# Patient Record
Sex: MALE | Race: WHITE | Employment: OTHER
[De-identification: names, ages, dates, MRNs, and addresses within clinical notes are randomized per-mention and may not be internally consistent; named-entity substitution may affect disease eponyms.]

---

## 2017-12-13 PROBLEM — I35.0 SEVERE AORTIC STENOSIS: Status: ACTIVE | Noted: 2017-12-13

## 2017-12-13 PROBLEM — Z95.2 S/P TAVR (TRANSCATHETER AORTIC VALVE REPLACEMENT): Status: ACTIVE | Noted: 2017-12-13

## 2018-03-12 ENCOUNTER — TELEPHONE (OUTPATIENT)
Dept: CASE MANAGEMENT | Age: 83
End: 2018-03-12

## 2018-03-12 NOTE — LETTER
CT Lung Screening/ Lung Nodule Program  [address] [place] [address]. [place], [address]       TO:   Seth Hollingsworth DO    FAX:   [phone]        FROM:   [place], [place] Lung Screening Program    DATE:   3/12/2018    PHONE:  [phone]    FAX:    [phone]      Comments:  Incidental Pulmonary Nodule Notification    RE:  Gissell Dash  7/20/1930        The information contained in this fax message is intended only for Personal and Confidential use of the designated recipient(s) names above. This information is to be handled as Privileged and Confidential. If the reader of this message is not the intended recipient, you are hereby notified that you have received this document in error, and that any review, dissemination, distribution, or copying of this message is strictly prohibited. If you receive this communication in error, please notify us immediately at the above number and return the original message to us by mail. Thank you. [facility] of [facility]. Medical Imaging Services                              3/12/2018           Seth Hollingsworth DO  [address] [place] [address]. [place] [id number]  Fax: [phone]      Dear  Seth Hollingsworth DO :                                      This is a notification from the [facility] Nodule Program.  Your patient, Gissell Dash (7/20/1930) had an imaging study (11/30/17) that noted an incidental pulmonary nodule/s. Follow up was recommended according to [facility] for Management of Pulmonary Nodules If this patient is not currently active within your practice, please contact us immediately so we can update records. This letter is a notification only, if you would like this patient enrolled and followed by our program and it's providers, please call our patient navigator at [phone].      If you have any additional questions or concerns regarding our Lung Nodule Program or our CT Lung Cancer Screening Program,  please don't hesitate to call. Sincerely,    Pulmonary Nodule Program  [address]:  [phone]  F:  [phone]                        Medical Imaging Services      3/12/2018      [address] [address]              Dear Adi Montano records indicate that over the past year you had an imaging study/studies done while a patient at a [facility] facility. Because your health is our primary concern, we want to make sure we have the correct primary care physician on file. We currently have DO em Valentin as your primary care physician. If this information is not accurate, please call [phone] and provide us with the correct information. If this information is correct, please make sure you have discussed your visit with your physician and understand all results and any follow up recommendations that may or may not be needed now or in the future.            Sincerely,    [facility]:  [phone]  F:  [phone]

## 2018-03-12 NOTE — TELEPHONE ENCOUNTER
Incoming notification of incidental pulmonary nodule. No call was made, encounter was opened for documentation in the lung nodule navigator flow sheet. Patient has suggested follow up per [facility] for management of incidental pulmonary nodules. Patients with nodules measuring under 0.8cm are not automatically enrolled into incidental pulmonary nodule program.   Notification of nodules letter faxed to Pushpa Newsome DO 3/12/2018 4:11 PM along with instructions on how to enroll this patient into our incidental pulmonary nodule program if desired. Receipt verified. Letter mailed to patients home stating he should contact his primary care physician to discuss any follow up recommendations.        Malissa Ceballos, Lung Nodule Navigator

## 2018-05-11 ENCOUNTER — OFFICE VISIT (OUTPATIENT)
Dept: CARDIOLOGY CLINIC | Age: 83
End: 2018-05-11
Payer: MEDICARE

## 2018-05-11 VITALS
WEIGHT: 204.2 LBS | HEIGHT: 70 IN | SYSTOLIC BLOOD PRESSURE: 128 MMHG | RESPIRATION RATE: 18 BRPM | BODY MASS INDEX: 29.23 KG/M2 | HEART RATE: 63 BPM | DIASTOLIC BLOOD PRESSURE: 72 MMHG

## 2018-05-11 DIAGNOSIS — I10 HYPERTENSION, UNSPECIFIED TYPE: Chronic | ICD-10-CM

## 2018-05-11 DIAGNOSIS — I25.10 CORONARY ARTERY DISEASE INVOLVING NATIVE CORONARY ARTERY OF NATIVE HEART WITHOUT ANGINA PECTORIS: Primary | Chronic | ICD-10-CM

## 2018-05-11 PROCEDURE — 99213 OFFICE O/P EST LOW 20 MIN: CPT | Performed by: INTERNAL MEDICINE

## 2018-05-11 PROCEDURE — 93000 ELECTROCARDIOGRAM COMPLETE: CPT | Performed by: INTERNAL MEDICINE

## 2018-12-13 ENCOUNTER — HOSPITAL ENCOUNTER (OUTPATIENT)
Dept: NON INVASIVE DIAGNOSTICS | Age: 83
Discharge: HOME | End: 2018-12-13
Payer: MEDICARE

## 2018-12-13 VITALS
HEART RATE: 71 BPM | DIASTOLIC BLOOD PRESSURE: 72 MMHG | HEIGHT: 70 IN | TEMPERATURE: 98 F | RESPIRATION RATE: 24 BRPM | SYSTOLIC BLOOD PRESSURE: 159 MMHG | WEIGHT: 198 LBS | BODY MASS INDEX: 28.35 KG/M2

## 2018-12-13 LAB
LV EF: 60 %
LVEF MODALITY: NORMAL

## 2018-12-13 PROCEDURE — 99213 OFFICE O/P EST LOW 20 MIN: CPT | Performed by: PHYSICIAN ASSISTANT

## 2018-12-13 PROCEDURE — 93306 TTE W/DOPPLER COMPLETE: CPT

## 2018-12-13 NOTE — PROGRESS NOTES
[facility]  Visit Note      Patient name: Anny Cui    Reason for visit: TAVR follow up     Referring Physician:  Paola Pitts MD    Primary Care Physician: Ifeanyi Camarena DO    Date of service: 12/13/2018      Chief Complaint: TAVR follow up     HPI: Mr. Abida Tyler presents for follow up s/p TAVR on 12/13/17. He is doing well. He denies chest pain, sob/kaminski, orthopnea, PND, edema, palpitations or syncope. He says \"if my legs worked I'd be doing great. \" He has foot drop on the right since hip surgery 15 years ago. He otherwise denies complaints. Allergies: No Known Allergies    Home medications:    Current Outpatient Prescriptions   Medication Sig Dispense Refill    CycloSPORINE (RESTASIS OP) Apply 1 drop to eye 2 times daily      Cholecalciferol (VITAMIN D3) 3000 units TABS Take by mouth      Budesonide-Formoterol Fumarate (SYMBICORT IN) Inhale into the lungs      nitroGLYCERIN (NITROSTAT) 0.4 MG SL tablet Place 1 tablet under the tongue every 5 minutes as needed for Chest pain 25 tablet 5    isosorbide mononitrate (IMDUR) 60 MG CR tablet Take 1 tablet by mouth daily 90 tablet 3    metoprolol (LOPRESSOR) 25 MG tablet Take 1 tablet by mouth 2 times daily 180 tablet 3    tamsulosin (FLOMAX) 0.4 MG capsule Take 0.4 mg by mouth every evening   0    ipratropium-albuterol (DUONEB) 0.5-2.5 (3) MG/3ML SOLN nebulizer solution       atorvastatin (LIPITOR) 40 MG tablet Take 40 mg by mouth daily       aspirin 81 MG EC tablet Take 81 mg by mouth daily.  ALBUTEROL IN Inhale 2.5 mg into the lungs as needed.  levothyroxine (SYNTHROID) 125 MCG tablet Take 125 mcg by mouth daily.          Current Facility-Administered Medications   Medication Dose Route Frequency Provider Last Rate Last Dose    perflutren lipid microspheres (DEFINITY) injection 1.65 mg  1.5 mL Intravenous ONCE PRN Doneta Bernheim, PA           Past Medical History:  Past Medical History:   Diagnosis Date

## 2019-05-22 NOTE — PROGRESS NOTES
perfusion abnormality. 14. Chronic kidney disease. BUN 20, Cr 1.6, GFR 44 - 12/12/2012. 15. Lexiscan MPS, 03/26/2013. Inferolateral infarction. Small area distal anteroseptal and apical ischemia. EF preserved at 72%. TID is present suggesting multivessel disease. Intermediate to high risk study. 16. Echo, 04/25/2013. Normal LV size, thickness and systolic function, Stage II diastolic dysfunction, LAE, moderate AS, peak/mean gradients 25/12 mmHg, SUSAN 1.2 cm², RVSP normal.  17. Left parotid mass removed, Dr. Iqra Bangura at [facility], 07/10/2014. 18. Echo, 03/26/2015. Normal LV size, wall thickness and EF. Stage Ia diastolic dysfunction. Marked LAE. Moderate aortic stenosis, mean gradient 18 mmHg, SUSAN ~1.3 cm². VTI 0.39. Moderate MAC, mild MR.   19. TTE 10/09/17. AV moderately sclerotic. AV leaflet motion restricted. AV peak gradient = 3.8 m/s. SUSAN 0.7 cm2. DI = 0.25. Severe aortic stenosis. 20. Cardiac catheterization, 11/16/17. [facility] no significant stenosis. LAD mid 100% chronic total occlusion. CX mid 80% stenosis, distal chronic total occlusion. RCA dominant vessel. Posterolateral branch mid 100% occlusion. PDA ostial 80% stenosis. 1.7-2.0 mm vessel. SVG-OM proximal diffuse 80% stenosis. Good distal runoff. OM supplies a very tiny vessel. SVG Y-graft to PDA and diagonal widely patent. LIMA-LAD widely patent. 99% stenosis in apical portion of LAD. approximately 1 mm vessel at this level. 21. S/P TAVR on 12/13/17. .  22. Echo, 1/11/2018. Normal LV systolic function. EF 60-65%. Mild concentric LVH. Normal RV size and function. Severely dilated LA by volume index. Severe MAC. Mild MR. History of TAVR (Brandi 3) with 26 mm bioprosthetic valve. AV mean gradient 13 mmHg. Mild TR.  RVSP 32 mmHg.     Review of Systems:  Constitutional: negative for fever and chills  Respiratory: negative for cough and hemoptysis  Cardiovascular:   Gastrointestinal: negative for abdominal pain, diarrhea, nausea and vomiting  Genitourinary:negative for dysuria and hematuria  Derm: negative for rash and skin lesion(s)  Neurological: negative for seizures and tremors  Endocrine: negative for diabetic symptoms including polydipsia and polyuria  Musculoskeletal: negative for CTD  Psychiatric: negative for psychosis and major depression    On examination, he is alert, pleasant, elderly man in no distress. Skin is warm and dry. Respirations are unlabored. /70   Pulse 69   Resp 14   Ht 5' 10\" (1.778 m)   Wt 198 lb (89.8 kg)   BMI 28.41 kg/m² . HEENT negative for scleral icterus. Extraocular muscles intact. No facial asymmetry or central cyanosis. Neck without masses or goiter. No bruit or JVD. Cardiac apex not displaced. Rhythm regular. Abdomen normal.  Extremities without edema. Lung fields are clear. EKG today shows sinus mechanism 69/m. 1st degree block. Possible old inferior MI. The patient symptomatically and functionally stable. Medications are reviewed today and no changes made. He will have follow-up in one year.     At completion of today's visit, medications include the following:    Current Outpatient Medications:     CycloSPORINE (RESTASIS OP), Apply 1 drop to eye 2 times daily, Disp: , Rfl:     Cholecalciferol (VITAMIN D3) 3000 units TABS, Take by mouth, Disp: , Rfl:     Budesonide-Formoterol Fumarate (SYMBICORT IN), Inhale into the lungs, Disp: , Rfl:     nitroGLYCERIN (NITROSTAT) 0.4 MG SL tablet, Place 1 tablet under the tongue every 5 minutes as needed for Chest pain, Disp: 25 tablet, Rfl: 5    isosorbide mononitrate (IMDUR) 60 MG CR tablet, Take 1 tablet by mouth daily, Disp: 90 tablet, Rfl: 3    metoprolol (LOPRESSOR) 25 MG tablet, Take 1 tablet by mouth 2 times daily, Disp: 180 tablet, Rfl: 3    tamsulosin (FLOMAX) 0.4 MG capsule, Take 0.4 mg by mouth every evening , Disp: , Rfl: 0    ipratropium-albuterol (DUONEB) 0.5-2.5 (3) MG/3ML

## 2019-05-23 ENCOUNTER — OFFICE VISIT (OUTPATIENT)
Dept: CARDIOLOGY CLINIC | Age: 84
End: 2019-05-23
Payer: MEDICARE

## 2019-05-23 VITALS
HEART RATE: 69 BPM | DIASTOLIC BLOOD PRESSURE: 70 MMHG | SYSTOLIC BLOOD PRESSURE: 110 MMHG | HEIGHT: 70 IN | WEIGHT: 198 LBS | RESPIRATION RATE: 14 BRPM | BODY MASS INDEX: 28.35 KG/M2

## 2019-05-23 DIAGNOSIS — I25.10 CORONARY ARTERY DISEASE INVOLVING NATIVE CORONARY ARTERY OF NATIVE HEART WITHOUT ANGINA PECTORIS: Primary | ICD-10-CM

## 2019-05-23 PROCEDURE — 93000 ELECTROCARDIOGRAM COMPLETE: CPT | Performed by: INTERNAL MEDICINE

## 2019-05-23 PROCEDURE — 99213 OFFICE O/P EST LOW 20 MIN: CPT | Performed by: INTERNAL MEDICINE

## 2019-09-30 ENCOUNTER — OFFICE VISIT (OUTPATIENT)
Dept: CARDIOLOGY CLINIC | Age: 84
End: 2019-09-30
Payer: MEDICARE

## 2019-09-30 VITALS
DIASTOLIC BLOOD PRESSURE: 70 MMHG | HEIGHT: 70 IN | SYSTOLIC BLOOD PRESSURE: 130 MMHG | RESPIRATION RATE: 16 BRPM | BODY MASS INDEX: 28.37 KG/M2 | WEIGHT: 198.2 LBS | HEART RATE: 71 BPM

## 2019-09-30 DIAGNOSIS — I25.10 CORONARY ARTERY DISEASE INVOLVING NATIVE CORONARY ARTERY OF NATIVE HEART WITHOUT ANGINA PECTORIS: Primary | ICD-10-CM

## 2019-09-30 PROCEDURE — 93000 ELECTROCARDIOGRAM COMPLETE: CPT | Performed by: INTERNAL MEDICINE

## 2019-09-30 PROCEDURE — 99213 OFFICE O/P EST LOW 20 MIN: CPT | Performed by: INTERNAL MEDICINE

## 2019-12-12 ENCOUNTER — HOSPITAL ENCOUNTER (OUTPATIENT)
Dept: NON INVASIVE DIAGNOSTICS | Age: 84
Discharge: HOME | End: 2019-12-12
Payer: MEDICARE

## 2019-12-12 VITALS
BODY MASS INDEX: 27.92 KG/M2 | WEIGHT: 195 LBS | DIASTOLIC BLOOD PRESSURE: 77 MMHG | TEMPERATURE: 98.3 F | SYSTOLIC BLOOD PRESSURE: 164 MMHG | HEIGHT: 70 IN | HEART RATE: 65 BPM

## 2019-12-12 LAB
LV EF: 53 %
LVEF MODALITY: NORMAL

## 2019-12-12 PROCEDURE — 99213 OFFICE O/P EST LOW 20 MIN: CPT | Performed by: PHYSICIAN ASSISTANT

## 2019-12-12 PROCEDURE — 93306 TTE W/DOPPLER COMPLETE: CPT

## 2019-12-12 PROCEDURE — 99211 OFF/OP EST MAY X REQ PHY/QHP: CPT

## 2020-07-07 ENCOUNTER — HOSPITAL ENCOUNTER (OUTPATIENT)
Age: 85
Discharge: HOME | End: 2020-07-07
Payer: MEDICARE

## 2020-07-07 ENCOUNTER — OFFICE VISIT (OUTPATIENT)
Dept: CARDIOLOGY CLINIC | Age: 85
End: 2020-07-07
Payer: MEDICARE

## 2020-07-07 VITALS
DIASTOLIC BLOOD PRESSURE: 62 MMHG | SYSTOLIC BLOOD PRESSURE: 128 MMHG | HEART RATE: 77 BPM | TEMPERATURE: 97.3 F | HEIGHT: 69 IN | WEIGHT: 191.4 LBS | RESPIRATION RATE: 16 BRPM | BODY MASS INDEX: 28.35 KG/M2

## 2020-07-07 LAB — PRO-BNP: ABNORMAL PG/ML (ref 0–450)

## 2020-07-07 PROCEDURE — 99214 OFFICE O/P EST MOD 30 MIN: CPT | Performed by: INTERNAL MEDICINE

## 2020-07-07 PROCEDURE — 36415 COLL VENOUS BLD VENIPUNCTURE: CPT

## 2020-07-07 PROCEDURE — 93000 ELECTROCARDIOGRAM COMPLETE: CPT | Performed by: INTERNAL MEDICINE

## 2020-07-07 PROCEDURE — 83880 ASSAY OF NATRIURETIC PEPTIDE: CPT

## 2020-07-07 RX ORDER — BUSPIRONE HYDROCHLORIDE 5 MG/1
TABLET ORAL
COMMUNITY
Start: 2020-06-26

## 2020-07-07 NOTE — PROGRESS NOTES
[facility]  Judy Gomes. Lucina Brower M.D. Kieran Mendes M.D.  Brennan Blake. Alicia Kearns M.D. Lor Wilkins M.D. Jocelyn Jimenez M.D. MD Vanessa Cast   7/20/1930  Bell Castillo DO      This 42-year-old man had outpatient cardiac assessment today. He is accompanied by family. He has becoming progressively dyspneic. This occurs with lying flat or minor activity. According to the family this may be manifest as \"anxiety\". He has had bilateral ankle swelling which is reported to not be dependent. He has not had sweats dizziness or chest pain. He has a history of chronic ischemic heart disease (CABG 2004) and TAVR (12/13/2017). Patient is increasingly sedentary with advanced age and his right foot drop. He is not having any chest pain or dizziness, however. He denies orthopnea or PND. He uses a walker. He is concerned that some of his leg weakness may be due to Lipitor and today he inquires about switching to Crestor. Medical History:  Medical History:  1. Presentation with atypical chest pain, 02/2004. Abnormal MPS. OP cardiac catheterization showed severe LAD and CX stenoses. The RCA had no significant narrowing. EF normal.  2. CABG, 03/2004 by Dr. Annmarie [facility]-LAD, SVG-D1, SVG-OM1 and SVG-posterolateral branch of the CX. 3. TIA - transient visual loss, 1998. CEA [id number].   4. Back surgery and right hip replacement. Right foot drop developed after the hip surgery. Redo hip surgery in about 2004. 5. DVT circa 50 years ago. 6. COPD/reactive airway disease. Cigarette abuse, 7.5 pack years. Abstinent since 01/01/2004. 7. Essential hypertension. 8. Hyperlipidemia. 9. Lexiscan MPS, 04/09/2007. Small area of mostly fixed perfusion defect in inferolateral wall at basal and mid left ventricular levels suggesting previous MI with minimal ischemia within the infarction zone.   Mild inferolateral approximately 1 mm vessel at this level. 21. S/P TAVR on 12/13/17. .  22. Echo, 1/11/2018. .  EF 60-65%. Mild concentric LVH. Normal RV   Severely dilated LA Severe MAC. Mild MR. History of TAVR (Brandi 3) with 26 mm bioprosthetic valve. AV mean gradient 13 mmHg. Mild TR.  RVSP 32 mmHg.         Review of Systems:  Constitutional: negative for fever and chills  Respiratory: negative for cough and hemoptysis  Cardiovascular:   Gastrointestinal: negative for abdominal pain, diarrhea, nausea and vomiting  Genitourinary:negative for dysuria and hematuria  Derm: negative for rash and skin lesion(s)  Neurological: negative for seizures and tremors  Endocrine: negative for diabetic symptoms including polydipsia and polyuria  Musculoskeletal: negative for CTD  Psychiatric: negative for psychosis and major depression    On examination, he is a pleasant alert appropriate elderly  man in no distress skin is warm and dry. Respirations are unlabored. /62 (Site: Right Upper Arm, Position: Sitting, Cuff Size: Medium Adult)   Pulse 77   Temp 97.3 °F (36.3 °C)   Resp 16   Ht 5' 9\" (1.753 m)   Wt 191 lb 6.4 oz (86.8 kg)   BMI 28.26 kg/m² . HEENT negative for scleral icterus. Extraocular muscles intact. No facial asymmetry or central cyanosis. Neck without masses or goiter. No bruit or JVD. Cardiac apex not displaced. Rhythm regular. Abdomen normal.  Extremities without edema. EKG today shows normal sinus rhythm. Left IVCD. Possible old lateral MI. Neck veins are not distended today detracting from the idea that his dyspnea is related to decompensated CHF. However, he does have typical pitting ankle edema. An echo is ordered. A proBNP is obtained today. Labs done recently Dr. Cristina Purcell office will be obtained and further recommendations made. There was a prolonged face-to-face discussion with the patient and family regarding the issues in the decision-making process and testing.     At

## 2020-07-08 ENCOUNTER — TELEPHONE (OUTPATIENT)
Dept: CARDIOLOGY CLINIC | Age: 85
End: 2020-07-08

## 2020-07-08 RX ORDER — FUROSEMIDE 40 MG/1
40 TABLET ORAL DAILY
Qty: 30 TABLET | Refills: 5 | Status: SHIPPED
Start: 2020-07-08 | End: 2020-09-08 | Stop reason: SDUPTHER

## 2020-07-14 ENCOUNTER — OFFICE VISIT (OUTPATIENT)
Dept: CARDIOLOGY CLINIC | Age: 85
End: 2020-07-14
Payer: MEDICARE

## 2020-07-14 ENCOUNTER — HOSPITAL ENCOUNTER (OUTPATIENT)
Age: 85
Discharge: HOME | End: 2020-07-16
Payer: MEDICARE

## 2020-07-14 VITALS
DIASTOLIC BLOOD PRESSURE: 68 MMHG | SYSTOLIC BLOOD PRESSURE: 110 MMHG | WEIGHT: 178 LBS | HEART RATE: 73 BPM | BODY MASS INDEX: 25.48 KG/M2 | RESPIRATION RATE: 18 BRPM | HEIGHT: 70 IN

## 2020-07-14 LAB
ANION GAP SERPL CALCULATED.3IONS-SCNC: 20 MMOL/L (ref 7–16)
BUN BLDV-MCNC: 37 MG/DL (ref 8–23)
CALCIUM SERPL-MCNC: 9.7 MG/DL (ref 8.6–10.2)
CHLORIDE BLD-SCNC: 99 MMOL/L (ref 98–107)
CO2: 22 MMOL/L (ref 22–29)
CREAT SERPL-MCNC: 1.6 MG/DL (ref 0.7–1.2)
GFR AFRICAN AMERICAN: 49
GFR NON-AFRICAN AMERICAN: 41 ML/MIN/1.73
GLUCOSE BLD-MCNC: 111 MG/DL (ref 74–99)
POTASSIUM SERPL-SCNC: 4 MMOL/L (ref 3.5–5)
PRO-BNP: ABNORMAL PG/ML (ref 0–450)
SODIUM BLD-SCNC: 141 MMOL/L (ref 132–146)

## 2020-07-14 PROCEDURE — 80048 BASIC METABOLIC PNL TOTAL CA: CPT

## 2020-07-14 PROCEDURE — 83880 ASSAY OF NATRIURETIC PEPTIDE: CPT

## 2020-07-14 PROCEDURE — 99213 OFFICE O/P EST LOW 20 MIN: CPT | Performed by: INTERNAL MEDICINE

## 2020-07-14 NOTE — PROGRESS NOTES
[facility]  Solis Gentile. Sonny Goltz, M.D. Simon eVga M.D.  Gurvinder Huang. Carrol Miller M.D. Layne Ramires M.D. Prakash Villafana M.D. MD Marcella Savage   7/20/1930  Asad Murray DO      This 66-year-old man returns to the office today for follow-up. He was diagnosed with decompensated CHF last week and started on Lasix. According to the patient he has diuresed 14 pounds and had resolution of ankle edema although he continues to have some increased abdominal girth. He is less short of breath and is able to lie flatter. Medical History:  Medical History:  1. Presentation with atypical chest pain, 02/2004.  Abnormal MPS.  OP cardiac catheterization showed severe LAD and CX stenoses.  The RCA had no significant narrowing.  EF normal.  2. CABG, 03/2004 by Dr. Bety Luong.  LIMA-LAD, SVG-D1, SVG-OM1 and SVG-posterolateral branch of the CX. 3. TIA - transient visual loss, 1998.  CEA [id number].   4. Back surgery and right hip replacement.  Right foot drop developed after the hip surgery.  Redo hip surgery in about 2004.    5. DVT circa 50 years ago. 6. COPD/reactive airway disease.  Cigarette abuse, 7.5 pack years.  Abstinent since 01/01/2004. 7. Essential hypertension. 8. Hyperlipidemia.    9. Lexiscan MPS, 04/09/2007.  Small area of mostly fixed perfusion defect in inferolateral wall at basal and mid left ventricular levels suggesting previous MI with minimal ischemia within the infarction zone.  Mild inferolateral hypokinesis.  EF normal at 72%. 10. Echo, 04/10/2007.  Normal LV size, wall motion and function with EF >60% and Stage I diastolic dysfunction.  Moderate PEDRO.  Aortic sclerosis with mild stenosis and SUSAN 2.1 cm2.   Mild MR, TR and PHTN.    11. Family history positive for multiple members developing heart disease beginning in their 46s and 62s.    12. No drug allergies.    13. Lexiscan MPS, 07/06/2010.  Wall motion normal,

## 2020-07-16 ENCOUNTER — TELEPHONE (OUTPATIENT)
Dept: CARDIOLOGY CLINIC | Age: 85
End: 2020-07-16

## 2020-07-28 ENCOUNTER — OFFICE VISIT (OUTPATIENT)
Dept: CARDIOLOGY CLINIC | Age: 85
End: 2020-07-28
Payer: MEDICARE

## 2020-07-28 ENCOUNTER — HOSPITAL ENCOUNTER (OUTPATIENT)
Age: 85
Discharge: HOME | End: 2020-07-30
Payer: MEDICARE

## 2020-07-28 VITALS
SYSTOLIC BLOOD PRESSURE: 124 MMHG | WEIGHT: 181 LBS | HEART RATE: 71 BPM | HEIGHT: 69 IN | RESPIRATION RATE: 16 BRPM | BODY MASS INDEX: 26.81 KG/M2 | DIASTOLIC BLOOD PRESSURE: 60 MMHG

## 2020-07-28 LAB
ANION GAP SERPL CALCULATED.3IONS-SCNC: 17 MMOL/L (ref 7–16)
BUN BLDV-MCNC: 23 MG/DL (ref 8–23)
CALCIUM SERPL-MCNC: 9 MG/DL (ref 8.6–10.2)
CHLORIDE BLD-SCNC: 100 MMOL/L (ref 98–107)
CO2: 25 MMOL/L (ref 22–29)
CREAT SERPL-MCNC: 1.4 MG/DL (ref 0.7–1.2)
GFR AFRICAN AMERICAN: 58
GFR NON-AFRICAN AMERICAN: 48 ML/MIN/1.73
GLUCOSE BLD-MCNC: 147 MG/DL (ref 74–99)
POTASSIUM SERPL-SCNC: 3.1 MMOL/L (ref 3.5–5)
PRO-BNP: ABNORMAL PG/ML (ref 0–450)
SODIUM BLD-SCNC: 142 MMOL/L (ref 132–146)

## 2020-07-28 PROCEDURE — 93000 ELECTROCARDIOGRAM COMPLETE: CPT | Performed by: INTERNAL MEDICINE

## 2020-07-28 PROCEDURE — 99213 OFFICE O/P EST LOW 20 MIN: CPT | Performed by: INTERNAL MEDICINE

## 2020-07-28 PROCEDURE — 83880 ASSAY OF NATRIURETIC PEPTIDE: CPT

## 2020-07-28 PROCEDURE — 80048 BASIC METABOLIC PNL TOTAL CA: CPT

## 2020-07-28 NOTE — PROGRESS NOTES
[facility]  [facility]. Nelly Gibson M.D. Manuel Harvey M.D.  Deyvi Rubio. Deisy López M.D. Rosalba Marcial M.D. Melanie Wright, Patrick Beverly M.D. MD Verónica Kulkarni   7/20/1930  Africa Souza DO      This 19-year-old man is seen for outpatient cardiac follow-up today. He has a history of CABG and TAVR. He has developed CHF with clinical volume overload. He was started on diuretics recently has had had clinical improvement. Today he has had resolution of ankle edema. He still has trouble lying flat. He has no appetite. He denies chest pain. He complains about right inguinal pain and burning. Medical History:  1. Presentation with atypical chest pain, 02/2004.  Abnormal MPS.  OP cardiac catheterization showed severe LAD and CX stenoses.  The RCA had no significant narrowing.  EF normal.  2. CABG, 03/2004 by Dr. Leanna Ellsworth.  LIMA-LAD, SVG-D1, SVG-OM1 and SVG-posterolateral branch of the CX. 3. TIA - transient visual loss, 1998.  CEA [id number].   4. Back surgery and right hip replacement.  Right foot drop developed after the hip surgery.  Redo hip surgery in about 2004.    5. DVT circa 50 years ago. 6. COPD/reactive airway disease.  Cigarette abuse, 7.5 pack years.  Abstinent since 01/01/2004. 7. Essential hypertension. 8. Hyperlipidemia.    9. Lexiscan MPS, 04/09/2007.  Small area of mostly fixed perfusion defect in inferolateral wall at basal and mid left ventricular levels suggesting previous MI with minimal ischemia within the infarction zone.  Mild inferolateral hypokinesis.  EF normal at 72%. 10. Echo, 04/10/2007.  Normal LV size, wall motion and function with EF >60% and Stage I diastolic dysfunction.  Moderate PEDRO.  Aortic sclerosis with mild stenosis and SUSAN 2.1 cm2.   Mild MR, TR and PHTN.    11. Family history positive for multiple members developing heart disease beginning in their 46s and 62s.    12. No drug allergies.    13. Lexiscan MPS, 07/06/2010. Wall motion normal, EF 67%, borderline TID.  Moderate size, mild to moderate severity, partially reversible lateral perfusion abnormality.    14. Chronic kidney disease.  BUN 20, Cr 1.6, GFR 44 - 12/12/2012. 15. Lexiscan MPS, 03/26/2013.  Inferolateral infarction.   Small area distal anteroseptal and apical ischemia.  EF preserved at 72%.  TID is present suggesting multivessel disease.  Intermediate to high risk study. 16. Echo, 04/25/2013.   Normal LV size, thickness and systolic function, Stage II diastolic dysfunction, LAE, moderate AS, peak/mean gradients 25/12 mmHg, SUSAN 1.2 cm², RVSP normal.  17. Left parotid mass removed, Dr. Roberto Mojica at [facility], 07/10/2014.    18. Echo, 03/26/2015.   Normal LV size, wall thickness and EF.  Stage Ia diastolic dysfunction.    Marked LAE.  Moderate aortic stenosis, mean gradient 18 mmHg, SUSAN ~1.3 cm².  VTI 0.39.  Moderate MAC, mild MR.   19. TTE 10/09/17.  AV moderately sclerotic.   AV leaflet motion restricted.   AV peak gradient = 3.8 m/s.   SUSAN 0.7 cm2.  DI = 0.25.  Severe aortic stenosis. 20. Cardiac catheterization, 11/16/17.   [facility] no significant stenosis.  LAD mid 100% chronic total occlusion.  CX mid 80% stenosis, distal chronic total occlusion.  RCA dominant vessel.  Posterolateral branch mid 100% occlusion.  PDA ostial 80% stenosis.  1.7-2.0 mm vessel.  SVG-OM proximal diffuse 80% stenosis.  Good distal runoff.  OM supplies a very tiny vessel.   SVG Y-graft to PDA and diagonal widely patent.  LIMA-LAD widely patent.  99% stenosis in apical portion of LAD.  approximately 1 mm vessel at this level. 21. S/P TAVR on 12/13/17. .  22. Echo, 1/11/2018. .  EF 60-65%.  Mild concentric LVH.  Normal RV   Severely dilated LA Severe MAC.  Mild MR.  History of TAVR (Brandi 3) with 26 mm bioprosthetic valve.  AV mean gradient 13 mmHg.  Mild TR.  RVSP 32 mmHg.   23. Outpatient cardiac follow-up 07/14/2020: Symptoms improved following 14 pound (SYMBICORT IN), Inhale into the lungs, Disp: , Rfl:     nitroGLYCERIN (NITROSTAT) 0.4 MG SL tablet, Place 1 tablet under the tongue every 5 minutes as needed for Chest pain, Disp: 25 tablet, Rfl: 5    isosorbide mononitrate (IMDUR) 60 MG CR tablet, Take 1 tablet by mouth daily, Disp: 90 tablet, Rfl: 3    metoprolol (LOPRESSOR) 25 MG tablet, Take 1 tablet by mouth 2 times daily, Disp: 180 tablet, Rfl: 3    tamsulosin (FLOMAX) 0.4 MG capsule, Take 0.4 mg by mouth every evening , Disp: , Rfl: 0    atorvastatin (LIPITOR) 40 MG tablet, Take 40 mg by mouth every other day , Disp: , Rfl:     aspirin 81 MG EC tablet, Take 81 mg by mouth daily. , Disp: , Rfl:     ALBUTEROL IN, Inhale 2.5 mg into the lungs as needed. , Disp: , Rfl:     levothyroxine (SYNTHROID) 125 MCG tablet, Take 125 mcg by mouth daily. , Disp: , Rfl:     ipratropium-albuterol (DUONEB) 0.5-2.5 (3) MG/3ML SOLN nebulizer solution, , Disp: , Rfl:       Note: This report was completed utilizing computer voice recognition software. Every effort has been made to ensure accuracy, however; inadvertent computerized transcription errors may be present.     --Christos Vaca MD on 7/28/2020 at 3:43 PM

## 2020-07-29 ENCOUNTER — TELEPHONE (OUTPATIENT)
Dept: CARDIOLOGY | Age: 85
End: 2020-07-29

## 2020-07-29 NOTE — TELEPHONE ENCOUNTER
SCHEDULED ECHO FOR 08-06-20. REVIEWED COVID CHECKLIST WITH PATIENT.     Electronically signed by Samantha President on 7/29/2020 at 8:31 AM

## 2020-07-31 ENCOUNTER — TELEPHONE (OUTPATIENT)
Dept: CARDIOLOGY CLINIC | Age: 85
End: 2020-07-31

## 2020-07-31 NOTE — TELEPHONE ENCOUNTER
Contacted patient's daughter, Amanda Sofia, with results and recommendations per Dr. Etienne Etienne. She verbalized understanding. Letter forwarded to Dr. Noel Moore.    ----- Message from Letty Teixeira MD sent at 7/29/2020  5:14 PM EDT -----    Segundo Wells tell the patient that his labs look good like he is continuing to have good improvement. We want him to stay on his current medications including the diuretic and see me back in the next 10-14 days or earlier if already scheduled.

## 2020-08-05 ENCOUNTER — TELEPHONE (OUTPATIENT)
Dept: CARDIOLOGY | Age: 85
End: 2020-08-05

## 2020-08-06 ENCOUNTER — HOSPITAL ENCOUNTER (OUTPATIENT)
Dept: CARDIOLOGY | Age: 85
Discharge: HOME | End: 2020-08-06
Payer: MEDICARE

## 2020-08-06 LAB
LV EF: 30 %
LVEF MODALITY: NORMAL

## 2020-08-06 PROCEDURE — 2580000003 HC RX 258: Performed by: INTERNAL MEDICINE

## 2020-08-06 PROCEDURE — 6360000004 HC RX CONTRAST MEDICATION: Performed by: INTERNAL MEDICINE

## 2020-08-06 PROCEDURE — 93306 TTE W/DOPPLER COMPLETE: CPT

## 2020-08-06 RX ORDER — SODIUM CHLORIDE 0.9 % (FLUSH) 0.9 %
10 SYRINGE (ML) INJECTION PRN
Status: DISCONTINUED | OUTPATIENT
Start: 2020-08-06 | End: 2020-08-07 | Stop reason: HOSPADM

## 2020-08-06 RX ADMIN — SODIUM CHLORIDE, PRESERVATIVE FREE 10 ML: 5 INJECTION INTRAVENOUS at 13:54

## 2020-08-06 RX ADMIN — PERFLUTREN 1.1 ML: 6.52 INJECTION, SUSPENSION INTRAVENOUS at 13:52

## 2020-08-11 ENCOUNTER — HOSPITAL ENCOUNTER (OUTPATIENT)
Age: 85
Discharge: HOME | End: 2020-08-13
Payer: MEDICARE

## 2020-08-11 ENCOUNTER — OFFICE VISIT (OUTPATIENT)
Dept: CARDIOLOGY CLINIC | Age: 85
End: 2020-08-11
Payer: MEDICARE

## 2020-08-11 VITALS
DIASTOLIC BLOOD PRESSURE: 60 MMHG | HEIGHT: 70 IN | WEIGHT: 179.4 LBS | SYSTOLIC BLOOD PRESSURE: 124 MMHG | RESPIRATION RATE: 14 BRPM | BODY MASS INDEX: 25.68 KG/M2 | HEART RATE: 95 BPM

## 2020-08-11 LAB
ANION GAP SERPL CALCULATED.3IONS-SCNC: 15 MMOL/L (ref 7–16)
BUN BLDV-MCNC: 22 MG/DL (ref 8–23)
CALCIUM SERPL-MCNC: 9.5 MG/DL (ref 8.6–10.2)
CHLORIDE BLD-SCNC: 99 MMOL/L (ref 98–107)
CO2: 24 MMOL/L (ref 22–29)
CREAT SERPL-MCNC: 1.4 MG/DL (ref 0.7–1.2)
GFR AFRICAN AMERICAN: 58
GFR NON-AFRICAN AMERICAN: 48 ML/MIN/1.73
GLUCOSE BLD-MCNC: 127 MG/DL (ref 74–99)
POTASSIUM SERPL-SCNC: 4.3 MMOL/L (ref 3.5–5)
PRO-BNP: ABNORMAL PG/ML (ref 0–450)
SODIUM BLD-SCNC: 138 MMOL/L (ref 132–146)

## 2020-08-11 PROCEDURE — 80048 BASIC METABOLIC PNL TOTAL CA: CPT

## 2020-08-11 PROCEDURE — 83880 ASSAY OF NATRIURETIC PEPTIDE: CPT

## 2020-08-11 PROCEDURE — 93000 ELECTROCARDIOGRAM COMPLETE: CPT | Performed by: INTERNAL MEDICINE

## 2020-08-11 PROCEDURE — 99214 OFFICE O/P EST MOD 30 MIN: CPT | Performed by: INTERNAL MEDICINE

## 2020-08-11 RX ORDER — METOPROLOL SUCCINATE 25 MG/1
25 TABLET, EXTENDED RELEASE ORAL 2 TIMES DAILY
Qty: 60 TABLET | Refills: 3 | Status: SHIPPED
Start: 2020-08-11 | End: 2020-08-21 | Stop reason: SDUPTHER

## 2020-08-11 RX ORDER — POTASSIUM CHLORIDE 750 MG/1
10 CAPSULE, EXTENDED RELEASE ORAL DAILY
COMMUNITY
Start: 2020-07-29

## 2020-08-11 RX ORDER — LISINOPRIL 5 MG/1
5 TABLET ORAL DAILY
Qty: 30 TABLET | Refills: 5 | Status: SHIPPED
Start: 2020-08-11 | End: 2021-02-01 | Stop reason: SDUPTHER

## 2020-08-11 NOTE — PROGRESS NOTES
[facility]  [facility]. Gaile Litten, M.D. Nicole Coleman M.D.  [facility]. Joaquín Chamberlain M.D. Shantell Varela M.D. Jaci Bergeron M.D. MD Yolanda Hood   7/20/1930  Freddy Cornejo DO      This 19-year-old man is seen today for outpatient cardiac follow-up. 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 He has been undergoing diuresis for volume overload/lower extremity edema. He has an excellent response with some improvement in symptoms and resolution of ankle edema. Recent echo showed LVEF of 30%. Medical history:  1. Presentation with atypical chest pain, 02/2004.  Abnormal MPS.  OP cardiac catheterization showed severe LAD and CX stenoses.  The RCA had no significant narrowing.  EF normal.  2. CABG, 03/2004 by Dr. Karma West.  LIMA-LAD, SVG-D1, SVG-OM1 and SVG-posterolateral branch of the CX. 3. TIA - transient visual loss, 1998.  CEA [id number].   4. Back surgery and right hip replacement.  Right foot drop developed after the hip surgery.  Redo hip surgery in about 2004.    5. DVT circa 50 years ago. 6. COPD/reactive airway disease.  Cigarette abuse, 7.5 pack years.  Abstinent since 01/01/2004. 7. Essential hypertension. 8. Hyperlipidemia.    9. Lexiscan MPS, 04/09/2007.  Small area of mostly fixed perfusion defect in inferolateral wall at basal and mid left ventricular levels suggesting previous MI with minimal ischemia within the infarction zone.  Mild inferolateral hypokinesis.  EF normal at 72%. 10. Echo, 04/10/2007.  Normal LV size, wall motion and function with EF >60% and Stage I diastolic dysfunction.  Moderate PEDRO.  Aortic sclerosis with mild stenosis and SUSAN 2.1 cm2.   Mild MR, TR and PHTN.    11. Family history positive for multiple members developing heart disease beginning in their 46s and 62s.    12. No drug allergies.    13. Lexiscan MPS, 07/06/2010.  Wall motion normal, EF 67%, borderline TID.  Moderate size, mild to moderate severity, partially reversible lateral perfusion abnormality.    14. Chronic kidney disease.  BUN 20, Cr 1.6, GFR 44 - 12/12/2012. 15. Lexiscan MPS, 03/26/2013.  Inferolateral infarction.   Small area distal anteroseptal and apical ischemia.  EF preserved at 72%.  TID is present suggesting multivessel disease.  Intermediate to high risk study. 16. Echo, 04/25/2013.   Normal LV size, thickness and systolic function, Stage II diastolic dysfunction, LAE, moderate AS, peak/mean gradients 25/12 mmHg, SUSAN 1.2 cm², RVSP normal.  17. Left parotid mass removed, Dr. Yonathan Gallardo at [facility], 07/10/2014.    18. Echo, 03/26/2015.   Normal LV size, wall thickness and EF.  Stage Ia diastolic dysfunction.    Marked LAE.  Moderate aortic stenosis, mean gradient 18 mmHg, SUSAN ~1.3 cm².  VTI 0.39.  Moderate MAC, mild MR.   19. TTE 10/09/2017.  AV moderately sclerotic.   AV leaflet motion restricted.   AV peak gradient = 3.8 m/s.   SUSAN 0.7 cm2.  DI = 0.25.  Severe aortic stenosis. 20. Cardiac catheterization, 11/16/2017.   LMC no significant stenosis.  LAD mid 100% chronic total occlusion.  CX mid 80% stenosis, distal chronic total occlusion.  RCA dominant vessel.  Posterolateral branch mid 100% occlusion.  PDA ostial 80% stenosis.  1.7-2.0 mm vessel.  SVG-OM proximal diffuse 80% stenosis.  Good distal runoff.  OM supplies a very tiny vessel.   SVG Y-graft to PDA and diagonal widely patent.  LIMA-LAD widely patent.  99% stenosis in apical portion of LAD.  approximately 1 mm vessel at this level. 21. 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  22. Echo, 01/11/2018.  EF 60-65%.  Mild concentric LVH.  Normal RV   Severely dilated LA Severe MAC.  Mild MR.  History of TAVR (Brandi 3) with 26 mm bioprosthetic valve.  AV mean gradient 13 mmHg.  Mild TR.  RVSP 32 mmHg. 23. Outpatient cardiac follow-up, 07/14/2020. Symptoms improved following 14 pound diuresis. Ankle edema resolved. 24. Labs, 07/14/2020. 10 MEQ extended release capsule, Take 10 mEq by mouth daily, Disp: , Rfl:     metoprolol succinate (TOPROL XL) 25 MG extended release tablet, Take 1 tablet by mouth 2 times daily, Disp: 60 tablet, Rfl: 3    lisinopril (PRINIVIL;ZESTRIL) 5 MG tablet, Take 1 tablet by mouth daily, Disp: 30 tablet, Rfl: 5    furosemide (LASIX) 40 MG tablet, Take 1 tablet by mouth daily, Disp: 30 tablet, Rfl: 5    busPIRone (BUSPAR) 5 MG tablet, TAKE ONE TABLET BY MOUTH EVERY 12 HOURS AS NEEDED FOR ANXIOUSNESS, Disp: , Rfl:     CycloSPORINE (RESTASIS OP), Apply 1 drop to eye 2 times daily, Disp: , Rfl:     Budesonide-Formoterol Fumarate (SYMBICORT IN), Inhale into the lungs, Disp: , Rfl:     nitroGLYCERIN (NITROSTAT) 0.4 MG SL tablet, Place 1 tablet under the tongue every 5 minutes as needed for Chest pain, Disp: 25 tablet, Rfl: 5    tamsulosin (FLOMAX) 0.4 MG capsule, Take 0.4 mg by mouth every evening , Disp: , Rfl: 0    ipratropium-albuterol (DUONEB) 0.5-2.5 (3) MG/3ML SOLN nebulizer solution, , Disp: , Rfl:     atorvastatin (LIPITOR) 40 MG tablet, Take 40 mg by mouth every other day , Disp: , Rfl:     aspirin 81 MG EC tablet, Take 81 mg by mouth daily. , Disp: , Rfl:     ALBUTEROL IN, Inhale 2.5 mg into the lungs as needed. , Disp: , Rfl:     levothyroxine (SYNTHROID) 125 MCG tablet, Take 125 mcg by mouth daily. , Disp: , Rfl:       Note: This report was completed utilizing computer voice recognition software. Every effort has been made to ensure accuracy, however; inadvertent computerized transcription errors may be present.     --Regina Schaefer MD on 8/11/2020 at 5:42 PM

## 2020-08-13 ENCOUNTER — TELEPHONE (OUTPATIENT)
Dept: CARDIOLOGY CLINIC | Age: 85
End: 2020-08-13

## 2020-08-13 NOTE — TELEPHONE ENCOUNTER
MD Jackelin Prado    Caller: [place]specified [place],  8:30 PM)               Jackelin please tell Mr. Rey that his labs look good.  Please send a copy to the PCP.  No change in medications at this point we will see him back as scheduled      Contacted patient with lab results and recommendations per . Patient verbalized understanding. Labs faxed to Dr. Magdalena Avendaño.

## 2020-08-21 ENCOUNTER — HOSPITAL ENCOUNTER (OUTPATIENT)
Age: 85
Discharge: HOME | End: 2020-08-23
Payer: MEDICARE

## 2020-08-21 ENCOUNTER — OFFICE VISIT (OUTPATIENT)
Dept: CARDIOLOGY CLINIC | Age: 85
End: 2020-08-21
Payer: MEDICARE

## 2020-08-21 VITALS
WEIGHT: 176.3 LBS | RESPIRATION RATE: 18 BRPM | HEART RATE: 82 BPM | SYSTOLIC BLOOD PRESSURE: 118 MMHG | BODY MASS INDEX: 26.11 KG/M2 | HEIGHT: 69 IN | DIASTOLIC BLOOD PRESSURE: 60 MMHG

## 2020-08-21 PROCEDURE — 80048 BASIC METABOLIC PNL TOTAL CA: CPT

## 2020-08-21 PROCEDURE — 99214 OFFICE O/P EST MOD 30 MIN: CPT | Performed by: INTERNAL MEDICINE

## 2020-08-21 PROCEDURE — 36415 COLL VENOUS BLD VENIPUNCTURE: CPT | Performed by: INTERNAL MEDICINE

## 2020-08-21 PROCEDURE — 83880 ASSAY OF NATRIURETIC PEPTIDE: CPT

## 2020-08-21 PROCEDURE — 93000 ELECTROCARDIOGRAM COMPLETE: CPT | Performed by: INTERNAL MEDICINE

## 2020-08-21 RX ORDER — METOPROLOL SUCCINATE 25 MG/1
TABLET, EXTENDED RELEASE ORAL
Qty: 90 TABLET | Refills: 3 | Status: SHIPPED
Start: 2020-08-21 | End: 2021-01-04

## 2020-08-21 NOTE — PROGRESS NOTES
Moderate size,  moderate severity, partially reversible lateral perfusion abnormality. 14. Chronic kidney disease.  BUN 20, Cr 1.6, GFR 44 - 12/12/2012. 13. Lexiscan MPS, 03/26/2013.  Inferolateral infarction.   Small distal anteroseptal and apical reversible defect.  EF= 72%.  TID present   Intermediate to high risk study. 16. Echo, 04/25/2013.   Normal LV size, thickness and systolic function, Stage II diastolic dysfunction, LAE, moderate AS, peak/mean gradients 25/12 mmHg, SUSAN 1.2 cm², RVSP normal.  17. Left parotid mass removed, Dr. Bagley Sessions at [facility], 07/10/2014.    18. Echo, 03/26/2015.   Normal LV size, wall thickness and EF.  Stage Ia diastolic dysfunction.    Marked LAE.  Moderate aortic stenosis, mean gradient 18 mmHg, SUSAN ~1.3 cm².  VTI 0.39.  Moderate MAC, mild MR.   19. TTE 10/09/2017.  AV moderately sclerotic.   AV leaflet motion restricted.   AV peak gradient = 3.8 m/s.   SUSAN 0.7 cm2.  DI = 0.25.  Severe aortic stenosis. 20. Cardiac catheterization, 11/16/2017.   LMC no significant stenosis.  LAD mid 100% chronic total occlusion.  CX mid 80% stenosis, distal chronic total occlusion.  RCA dominant vessel.  Posterolateral branch mid 100% occlusion.  PDA ostial 80% stenosis.  1.7-2.0 mm vessel.  SVG-OM proximal diffuse 80% stenosis.  Good distal runoff.  OM supplies a very tiny vessel.   SVG Y-graft to PDA and diagonal widely patent.  LIMA-LAD widely patent.  99% stenosis in apical portion of LAD.  approximately 1 mm vessel at this level. 21. 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  22. Echo, 01/11/2018.  EF 60-65%.  Mild concentric LVH.  Normal RV   Severely dilated LA Severe MAC.  Mild MR.  s/p TAVR (Brandi 3 26 mm   AV mean gradient 13 mmHg.  Mild TR.  RVSP 32 mmHg. 23. Outpatient cardiac follow-up, 07/14/2020. Symptoms improved following 14 pound diuresis.  Ankle edema resolved. 24. Labs, 07/14/2020. ProBNP [id number]. BUN 37. Creatinine 1.6.  K 4.0.  25. Labs, 07/28/2020.   ProBNP =18794, BUN =23, creatinine =1.4.   26. Echo 08/26/2020: Normal LV size. EF 30%. Stage III diastolic dysfunction. Left atrium severely dilated. RVSP= 61. S/p TAVR asuncion. SUSAN= 1.27 cm 2.  27. Outpatient cardiac assessment 08/21/2020: Feels better. Leg edema resolved. Toprol XL increased to 50 a.m. and 25 mg PM.  Labs drawn            Review of Systems:  Constitutional: negative for fever and chills  Respiratory: negative for cough and hemoptysis  Cardiovascular:   Gastrointestinal: negative for abdominal pain, diarrhea, nausea and vomiting  Genitourinary:negative for dysuria and hematuria  Derm: negative for rash and skin lesion(s)  Neurological: negative for seizures and tremors  Endocrine: negative for diabetic symptoms including polydipsia and polyuria  Musculoskeletal: negative for CTD  Psychiatric: negative for psychosis and major depression    On examination, he is an alert pleasant elderly  male in no distress skin is warm and dry. Respirations are unlabored. /60   Pulse 82   Resp 18   Ht 5' 9\" (1.753 m)   Wt 176 lb 4.8 oz (80 kg)   BMI 26.03 kg/m² . HEENT negative for scleral icterus. Extraocular muscles intact. No facial asymmetry or central cyanosis. Neck without masses or goiter. No bruit or JVD. Cardiac apex not displaced. Rhythm regular. Abdomen normal.  Extremities without edema. Lungs are clear    EKG today shows sinus rhythm 82/min. First-degree block. APCs. Mild nonspecific lateral ST depression. Mild nonspecific lateral T abnormality. The patient is symptomatically and functionally improved with diuresis. Today Toprol-XL is increased to 75 mg daily. proBNP and BMP will be checked today. He will have follow-up in 2 weeks. There was a prolonged discussion today with the patient and family regarding his current status and the goals of treatment and the likelihood he will further upward titration of medications pending his clinical status.   Questions were answered. At completion of today's visit, medications include the following:    Current Outpatient Medications:     metoprolol succinate (TOPROL XL) 25 MG extended release tablet, Take 2 tablets in the morning and 1 tablet in the afternoon, Disp: 90 tablet, Rfl: 3    potassium chloride (MICRO-K) 10 MEQ extended release capsule, Take 10 mEq by mouth daily, Disp: , Rfl:     lisinopril (PRINIVIL;ZESTRIL) 5 MG tablet, Take 1 tablet by mouth daily, Disp: 30 tablet, Rfl: 5    furosemide (LASIX) 40 MG tablet, Take 1 tablet by mouth daily, Disp: 30 tablet, Rfl: 5    busPIRone (BUSPAR) 5 MG tablet, TAKE ONE TABLET BY MOUTH EVERY 12 HOURS AS NEEDED FOR ANXIOUSNESS, Disp: , Rfl:     CycloSPORINE (RESTASIS OP), Apply 1 drop to eye 2 times daily, Disp: , Rfl:     Budesonide-Formoterol Fumarate (SYMBICORT IN), Inhale into the lungs, Disp: , Rfl:     nitroGLYCERIN (NITROSTAT) 0.4 MG SL tablet, Place 1 tablet under the tongue every 5 minutes as needed for Chest pain, Disp: 25 tablet, Rfl: 5    tamsulosin (FLOMAX) 0.4 MG capsule, Take 0.4 mg by mouth every evening , Disp: , Rfl: 0    ipratropium-albuterol (DUONEB) 0.5-2.5 (3) MG/3ML SOLN nebulizer solution, , Disp: , Rfl:     atorvastatin (LIPITOR) 40 MG tablet, Take 40 mg by mouth every other day , Disp: , Rfl:     aspirin 81 MG EC tablet, Take 81 mg by mouth daily. , Disp: , Rfl:     ALBUTEROL IN, Inhale 2.5 mg into the lungs as needed. , Disp: , Rfl:     levothyroxine (SYNTHROID) 125 MCG tablet, Take 125 mcg by mouth daily. , Disp: , Rfl:       Note: This report was completed utilizing computer voice recognition software. Every effort has been made to ensure accuracy, however; inadvertent computerized transcription errors may be present.     --Makenzie Ray MD on 8/21/2020 at 5:03 PM

## 2020-08-21 NOTE — PROGRESS NOTES
Patient had blood work drawn in office. Patient had no issues and tolerated it well. Right hand. Maddison Platt MA.

## 2020-08-22 LAB
ANION GAP SERPL CALCULATED.3IONS-SCNC: 18 MMOL/L (ref 7–16)
BUN BLDV-MCNC: 28 MG/DL (ref 8–23)
CALCIUM SERPL-MCNC: 9.6 MG/DL (ref 8.6–10.2)
CHLORIDE BLD-SCNC: 98 MMOL/L (ref 98–107)
CO2: 22 MMOL/L (ref 22–29)
CREAT SERPL-MCNC: 1.7 MG/DL (ref 0.7–1.2)
GFR AFRICAN AMERICAN: 46
GFR NON-AFRICAN AMERICAN: 38 ML/MIN/1.73
GLUCOSE BLD-MCNC: 121 MG/DL (ref 74–99)
POTASSIUM SERPL-SCNC: 4.2 MMOL/L (ref 3.5–5)
PRO-BNP: 9620 PG/ML (ref 0–450)
SODIUM BLD-SCNC: 138 MMOL/L (ref 132–146)

## 2020-09-06 NOTE — PROGRESS NOTES
[facility]  [facility]. Cesar Montez M.D. Roverto Madrid M.D.  Robi Valerio. [facility], M.D. Vicky Patino M.D. Samanta Fletcher M.D. MD Sofiya Colon   7/20/1930  John Lee DO      This 20-year-old man is seen today for outpatient cardiac follow-up. He has a history of CABG in 2004 and TAVR in 2017. He has had decompensated CHF with volume overload and is being diuresed. Recent echo showed EF of 30%. On 08/11/2020 metoprolol tartrate was stopped as well as isosorbide mono. Toprol-XL was initiated 50 mg daily and lisinopril 5 mg daily. Today he reports sometimes dyspnea on walking room to room but no chest pain orthopnea or PND. He has bilateral \"leg weakness\". Medical history:  1. Presentation with atypical chest pain, 02/2004.  Abnormal MPS.  OP cardiac catheterization showed severe LAD and CX stenoses.  The RCA had no significant narrowing.  EF normal.  2. CABG, 03/2004 by Dr. Sasha Arboleda.  LIMA-LAD, SVG-D1, SVG-OM1 and SVG-posterolateral branch of the CX. 3. TIA - transient visual loss, 1998.  CEA [id number].   4. Back surgery and right hip replacement.  Right foot drop developed after the hip surgery.  Redo hip surgery in about 2004.    5. DVT circa 50 years ago. 6. COPD/reactive airway disease.  Cigarette abuse, 7.5 pack years.  Abstinent since 01/01/2004. 7. Essential hypertension. 8. Hyperlipidemia.    9. Lexiscan MPS, 04/09/2007.  Small area of mostly fixed perfusion defect in inferolateral wall at basal and mid left ventricular levels suggesting previous MI with minimal ischemia within the infarction zone.  Mild inferolateral hypokinesis.  EF normal at 72%. 10. Echo, 04/10/2007.  Normal LV size, wall motion and function with EF >60% and Stage I diastolic dysfunction.  Moderate PEDRO.  Aortic sclerosis with mild stenosis and SUSAN 2.1 cm2.   Mild MR, TR and PHTN.    11.  Family history : multiple members  heart disease beginning in  46s and 62s.    12. No drug allergies.    13. Lexiscan MPS, 07/06/2010. Wall motion normal, EF 67%,   Moderate size,  moderate severity, partially reversible lateral perfusion abnormality. 14. Chronic kidney disease.  BUN 20, Cr 1.6, GFR 44 - 12/12/2012. 13. Lexiscan MPS, 03/26/2013.  Inferolateral infarction.   Small distal anteroseptal and apical reversible defect.  EF= 72%.  TID present   Intermediate to high risk study. 16. Echo, 04/25/2013.   Normal LV size, thickness and systolic function, Stage II diastolic dysfunction, LAE, moderate AS, peak/mean gradients 25/12 mmHg, SUSAN 1.2 cm², RVSP normal.  17. Left parotid mass removed, Dr. Yaneli English at [facility], 07/10/2014.    18. Echo, 03/26/2015.   Normal LV size, wall thickness and EF.  Stage Ia diastolic dysfunction.    Marked LAE.  Moderate aortic stenosis, mean gradient 18 mmHg, SUSAN ~1.3 cm².  VTI 0.39.  Moderate MAC, mild MR.   19. TTE 10/09/2017.  AV moderately sclerotic.   AV leaflet motion restricted.   AV peak gradient = 3.8 m/s.   SUSAN 0.7 cm2.  DI = 0.25.  Severe aortic stenosis. 20. Cardiac catheterization, 11/16/2017.   LMC no significant stenosis.  LAD mid 100% chronic total occlusion.  CX mid 80% stenosis, distal chronic total occlusion.  RCA dominant vessel.  Posterolateral branch mid 100% occlusion.  PDA ostial 80% stenosis.  1.7-2.0 mm vessel.  SVG-OM proximal diffuse 80% stenosis.  Good distal runoff.  OM supplies a very tiny vessel.   SVG Y-graft to PDA and diagonal widely patent.  LIMA-LAD widely patent.  99% stenosis in apical portion of LAD.  approximately 1 mm vessel at this level. 21. 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  22. Echo, 01/11/2018.  EF 60-65%.  Mild concentric LVH.  Normal RV   Severely dilated LA Severe MAC.  Mild MR.  s/p TAVR (Brandi 3 26 mm   AV mean gradient 13 mmHg.  Mild TR.  RVSP 32 mmHg. 23. Outpatient cardiac follow-up, 07/14/2020.  Symptoms improved following 14 pound diuresis.  Ankle edema he is asked to decrease Lasix to alternate day. Today he had a brief episode of vasovagal syncope. He will have office follow-up in 3 weeks    At completion of today's visit, medications include the following:    Current Outpatient Medications:     furosemide (LASIX) 40 MG tablet, Take 1 tablet by mouth every other day, Disp: 30 tablet, Rfl: 5    metoprolol succinate (TOPROL XL) 25 MG extended release tablet, Take 2 tablets in the morning and 1 tablet in the afternoon, Disp: 90 tablet, Rfl: 3    potassium chloride (MICRO-K) 10 MEQ extended release capsule, Take 10 mEq by mouth daily, Disp: , Rfl:     lisinopril (PRINIVIL;ZESTRIL) 5 MG tablet, Take 1 tablet by mouth daily, Disp: 30 tablet, Rfl: 5    busPIRone (BUSPAR) 5 MG tablet, TAKE ONE TABLET BY MOUTH EVERY 12 HOURS AS NEEDED FOR ANXIOUSNESS, Disp: , Rfl:     Budesonide-Formoterol Fumarate (SYMBICORT IN), Inhale into the lungs, Disp: , Rfl:     nitroGLYCERIN (NITROSTAT) 0.4 MG SL tablet, Place 1 tablet under the tongue every 5 minutes as needed for Chest pain, Disp: 25 tablet, Rfl: 5    tamsulosin (FLOMAX) 0.4 MG capsule, Take 0.4 mg by mouth every evening , Disp: , Rfl: 0    ipratropium-albuterol (DUONEB) 0.5-2.5 (3) MG/3ML SOLN nebulizer solution, , Disp: , Rfl:     atorvastatin (LIPITOR) 40 MG tablet, Take 40 mg by mouth every other day , Disp: , Rfl:     aspirin 81 MG EC tablet, Take 81 mg by mouth daily. , Disp: , Rfl:     ALBUTEROL IN, Inhale 2.5 mg into the lungs as needed. , Disp: , Rfl:     levothyroxine (SYNTHROID) 125 MCG tablet, Take 125 mcg by mouth daily. , Disp: , Rfl:     CycloSPORINE (RESTASIS OP), Apply 1 drop to eye 2 times daily, Disp: , Rfl:       Note: This report was completed utilizing computer voice recognition software. Every effort has been made to ensure accuracy, however; inadvertent computerized transcription errors may be present.     --Cristal Castro MD on 9/8/2020 at 4:42 PM

## 2020-09-08 ENCOUNTER — OFFICE VISIT (OUTPATIENT)
Dept: CARDIOLOGY CLINIC | Age: 85
End: 2020-09-08
Payer: MEDICARE

## 2020-09-08 ENCOUNTER — HOSPITAL ENCOUNTER (OUTPATIENT)
Age: 85
Discharge: HOME | End: 2020-09-10
Payer: MEDICARE

## 2020-09-08 VITALS
RESPIRATION RATE: 20 BRPM | WEIGHT: 174 LBS | OXYGEN SATURATION: 99 % | HEART RATE: 79 BPM | HEIGHT: 69 IN | DIASTOLIC BLOOD PRESSURE: 60 MMHG | BODY MASS INDEX: 25.77 KG/M2 | SYSTOLIC BLOOD PRESSURE: 112 MMHG

## 2020-09-08 PROCEDURE — 93000 ELECTROCARDIOGRAM COMPLETE: CPT | Performed by: INTERNAL MEDICINE

## 2020-09-08 PROCEDURE — 99213 OFFICE O/P EST LOW 20 MIN: CPT | Performed by: INTERNAL MEDICINE

## 2020-09-08 PROCEDURE — 83880 ASSAY OF NATRIURETIC PEPTIDE: CPT

## 2020-09-08 PROCEDURE — 80048 BASIC METABOLIC PNL TOTAL CA: CPT

## 2020-09-08 RX ORDER — FUROSEMIDE 40 MG/1
40 TABLET ORAL EVERY OTHER DAY
Qty: 30 TABLET | Refills: 5
Start: 2020-09-08 | End: 2021-05-11 | Stop reason: SDUPTHER

## 2020-09-09 LAB
ANION GAP SERPL CALCULATED.3IONS-SCNC: 20 MMOL/L (ref 7–16)
BUN BLDV-MCNC: 41 MG/DL (ref 8–23)
CALCIUM SERPL-MCNC: 9.9 MG/DL (ref 8.6–10.2)
CHLORIDE BLD-SCNC: 98 MMOL/L (ref 98–107)
CO2: 19 MMOL/L (ref 22–29)
CREAT SERPL-MCNC: 1.7 MG/DL (ref 0.7–1.2)
GFR AFRICAN AMERICAN: 46
GFR NON-AFRICAN AMERICAN: 38 ML/MIN/1.73
GLUCOSE BLD-MCNC: 105 MG/DL (ref 74–99)
POTASSIUM SERPL-SCNC: 4.7 MMOL/L (ref 3.5–5)
PRO-BNP: 9065 PG/ML (ref 0–450)
SODIUM BLD-SCNC: 137 MMOL/L (ref 132–146)

## 2020-09-10 ENCOUNTER — TELEPHONE (OUTPATIENT)
Dept: CARDIOLOGY CLINIC | Age: 85
End: 2020-09-10

## 2020-09-10 NOTE — TELEPHONE ENCOUNTER
Contacted patient with lab results per Dr. Bc Villegas. Patient verbalized understanding. He will continue Lasix every other day. Letter forwarded to Dr. Eris Sadler.    ----- Message from Ayana Salazar MD sent at 9/10/2020 12:35 PM EDT -----    Nicholas Marroquin  tell the patient and/or family that the labs reflects the fact that he is being decongested. The fall and proBNP is a good sign. Renal function will hopefully stabilize on the alternate day dose.

## 2020-09-22 ENCOUNTER — OFFICE VISIT (OUTPATIENT)
Dept: CARDIOLOGY CLINIC | Age: 85
End: 2020-09-22
Payer: MEDICARE

## 2020-09-22 ENCOUNTER — HOSPITAL ENCOUNTER (OUTPATIENT)
Age: 85
Discharge: HOME | End: 2020-09-24
Payer: MEDICARE

## 2020-09-22 VITALS
SYSTOLIC BLOOD PRESSURE: 138 MMHG | BODY MASS INDEX: 25.34 KG/M2 | RESPIRATION RATE: 18 BRPM | DIASTOLIC BLOOD PRESSURE: 70 MMHG | WEIGHT: 177 LBS | HEIGHT: 70 IN | HEART RATE: 60 BPM

## 2020-09-22 LAB
ANION GAP SERPL CALCULATED.3IONS-SCNC: 12 MMOL/L (ref 7–16)
BUN BLDV-MCNC: 29 MG/DL (ref 8–23)
CALCIUM SERPL-MCNC: 9.4 MG/DL (ref 8.6–10.2)
CHLORIDE BLD-SCNC: 101 MMOL/L (ref 98–107)
CO2: 24 MMOL/L (ref 22–29)
CREAT SERPL-MCNC: 1.8 MG/DL (ref 0.7–1.2)
GFR AFRICAN AMERICAN: 43
GFR NON-AFRICAN AMERICAN: 36 ML/MIN/1.73
GLUCOSE BLD-MCNC: 104 MG/DL (ref 74–99)
POTASSIUM SERPL-SCNC: 4.8 MMOL/L (ref 3.5–5)
SODIUM BLD-SCNC: 137 MMOL/L (ref 132–146)

## 2020-09-22 PROCEDURE — 99214 OFFICE O/P EST MOD 30 MIN: CPT | Performed by: INTERNAL MEDICINE

## 2020-09-22 PROCEDURE — 93000 ELECTROCARDIOGRAM COMPLETE: CPT | Performed by: INTERNAL MEDICINE

## 2020-09-22 PROCEDURE — 80048 BASIC METABOLIC PNL TOTAL CA: CPT

## 2020-09-22 PROCEDURE — 36415 COLL VENOUS BLD VENIPUNCTURE: CPT | Performed by: INTERNAL MEDICINE

## 2020-09-22 NOTE — PROGRESS NOTES
normal, EF 67%,   Moderate size,  moderate severity, partially reversible lateral perfusion abnormality. 14. Chronic kidney disease.  BUN 20, Cr 1.6, GFR 44 - 12/12/2012. 15. Lexiscan MPS, 03/26/2013.  Inferolateral infarction.   Small distal anteroseptal and apical reversible defect.  EF= 72%.  TID present   Intermediate to high risk study. 16. Echo, 04/25/2013.   Normal LV size, thickness and systolic function, Stage II diastolic dysfunction, LAE, moderate AS, peak/mean gradients 25/12 mmHg, SUSAN 1.2 cm², RVSP normal.  17. Left parotid mass removed, Dr. Ford Church at [facility], 07/10/2014.    18. Echo, 03/26/2015.   Normal LV size, wall thickness and EF.  Stage Ia diastolic dysfunction.    Marked LAE.  Moderate aortic stenosis, mean gradient 18 mmHg, SUSAN ~1.3 cm².  VTI 0.39.  Moderate MAC, mild MR.   19. TTE 10/09/2017.  AV moderately sclerotic.   AV leaflet motion restricted.   AV peak gradient = 3.8 m/s.   SUSAN 0.7 cm2.  DI = 0.25.  Severe aortic stenosis. 20. Cardiac catheterization, 11/16/2017.   LMC no significant stenosis.  LAD mid 100% chronic total occlusion.  CX mid 80% stenosis, distal chronic total occlusion.  RCA dominant vessel.  Posterolateral branch mid 100% occlusion.  PDA ostial 80% stenosis.  1.7-2.0 mm vessel.  SVG-OM proximal diffuse 80% stenosis.  Good distal runoff.  OM supplies a very tiny vessel.   SVG Y-graft to PDA and diagonal widely patent.  LIMA-LAD widely patent.  99% stenosis in apical portion of LAD.  approximately 1 mm vessel at this level. 21. 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  22. Echo, 01/11/2018.  EF 60-65%.  Mild concentric LVH.  Normal RV   Severely dilated LA Severe MAC.  Mild MR.  s/p TAVR (Brandi 3 26 mm   AV mean gradient 13 mmHg.  Mild TR.  RVSP 32 mmHg. 23. Outpatient cardiac follow-up, 07/14/2020. Symptoms improved following 14 pound diuresis.  Ankle edema resolved.   24. Labs, 07/14/2020.  ProBNP 75056.  BUN 37.  Creatinine 1.6.  K 4.0.  25. Labs, 07/28/2020.  ProBNP weeks.  There was a long discussion with the patient and family regarding congestive heart failure and the use of diuretics. The importance of daily weight was reviewed as well as diet restriction. At completion of today's visit, medications include the following:    Current Outpatient Medications:     furosemide (LASIX) 40 MG tablet, Take 1 tablet by mouth every other day (Patient taking differently: Take 40 mg by mouth every other day Suraj Chang), Disp: 30 tablet, Rfl: 5    metoprolol succinate (TOPROL XL) 25 MG extended release tablet, Take 2 tablets in the morning and 1 tablet in the afternoon, Disp: 90 tablet, Rfl: 3    potassium chloride (MICRO-K) 10 MEQ extended release capsule, Take 10 mEq by mouth daily, Disp: , Rfl:     lisinopril (PRINIVIL;ZESTRIL) 5 MG tablet, Take 1 tablet by mouth daily, Disp: 30 tablet, Rfl: 5    busPIRone (BUSPAR) 5 MG tablet, TAKE ONE TABLET BY MOUTH EVERY 12 HOURS AS NEEDED FOR ANXIOUSNESS, Disp: , Rfl:     Budesonide-Formoterol Fumarate (SYMBICORT IN), Inhale into the lungs, Disp: , Rfl:     nitroGLYCERIN (NITROSTAT) 0.4 MG SL tablet, Place 1 tablet under the tongue every 5 minutes as needed for Chest pain, Disp: 25 tablet, Rfl: 5    tamsulosin (FLOMAX) 0.4 MG capsule, Take 0.4 mg by mouth every evening , Disp: , Rfl: 0    ipratropium-albuterol (DUONEB) 0.5-2.5 (3) MG/3ML SOLN nebulizer solution, , Disp: , Rfl:     atorvastatin (LIPITOR) 40 MG tablet, Take 40 mg by mouth every other day , Disp: , Rfl:     aspirin 81 MG EC tablet, Take 81 mg by mouth daily. , Disp: , Rfl:     ALBUTEROL IN, Inhale 2.5 mg into the lungs as needed. , Disp: , Rfl:     levothyroxine (SYNTHROID) 125 MCG tablet, Take 125 mcg by mouth daily. , Disp: , Rfl:     CycloSPORINE (RESTASIS OP), Apply 1 drop to eye 2 times daily, Disp: , Rfl:       Note: This report was completed utilizing computer voice recognition software.  Every effort has been made to ensure accuracy, however; inadvertent computerized transcription errors may be present.     --Donna Ryan MD on 9/22/2020 at 2:37 PM

## 2020-09-29 ENCOUNTER — TELEPHONE (OUTPATIENT)
Dept: CARDIOLOGY CLINIC | Age: 85
End: 2020-09-29

## 2020-11-10 ENCOUNTER — OFFICE VISIT (OUTPATIENT)
Dept: CARDIOLOGY CLINIC | Age: 85
End: 2020-11-10
Payer: MEDICARE

## 2020-11-10 VITALS
BODY MASS INDEX: 25.43 KG/M2 | DIASTOLIC BLOOD PRESSURE: 60 MMHG | HEART RATE: 69 BPM | OXYGEN SATURATION: 98 % | SYSTOLIC BLOOD PRESSURE: 122 MMHG | HEIGHT: 70 IN | RESPIRATION RATE: 16 BRPM | WEIGHT: 177.6 LBS

## 2020-11-10 PROCEDURE — 99213 OFFICE O/P EST LOW 20 MIN: CPT | Performed by: INTERNAL MEDICINE

## 2020-11-10 PROCEDURE — 93000 ELECTROCARDIOGRAM COMPLETE: CPT | Performed by: INTERNAL MEDICINE

## 2020-11-10 NOTE — PROGRESS NOTES
[facility]  Eric Saunders. Elba Mai M.D. Louis Flaherty M.D.  Parviz Chung. Jignesh Saunders M.D. Lindaann Lundborg, Orpah Bash, M.D. Johann Rinne, Jacqualin Ewings, M.D. Nate Pollock MD                Russel Mattapiero   7/20/1930  Rancho Hewitt DO      This 40-year-old man is seen for outpatient cardiac follow-up today. He tires easily and uses a walker but is not having any chest pain or dyspnea. He is not having any ankle edema. His appetite is fair. He seems a little depressed today because some of his friends have recently passed away       history:  1. Presentation with atypical chest pain, 02/2004.  Abnormal MPS.  OP cardiac catheterization  severe LAD and CX stenoses.  The RCA no significant stenosis EF normal.  2. CABG, 03/2004  Dr. Osmani Ortega.  LIMA-LAD, SVG-D1, SVG-OM1 and SVG-posterolateral branch of the CX. 3. TIA - transient visual loss, [address] [id number]. 4. Surgical history back surgery and right hip replacement.  Right foot drop developed after the hip surgery.  Redo hip surgery  2004.    5. DVT circa 50 years ago. 6. COPD/reactive airway disease.  Cigarette abuse, 7.5 pack years.  Abstinent since 01/01/2004. 7. Essential hypertension. 8. Hyperlipidemia.    9. Lexiscan MPS, 04/09/2007.  Small  mostly fixed  defect  inferolateral wall at basal and mid levels suggesting previous MI. Hypokinesis.  EF 72%. 10. Echo, 04/10/2007.  Normal LV size, wall motion and function with EF >60% and Stage I diastolic dysfunction.  Moderate PEDRO.  Aortic sclerosis with mild stenosis and SUSAN 2.1 cm2.   Mild MR, TR and PHTN.    11. Family history : multiple members  heart disease beginning in  52s and 62s.    12. No drug allergies.    13. Lexiscan MPS, 07/06/2010. Wall motion normal, EF 67%,   Moderate size,  moderate severity, partially reversible lateral perfusion abnormality. 14. Chronic kidney disease.  BUN 20, Cr 1.6, GFR 44 - 12/12/2012.   15. Lexiscan MPS, 03/26/2013.  Inferolateral infarction.   Small distal anteroseptal and apical reversible defect.  EF= 72%.  TID present   Intermediate to high risk study. 16. Echo, 04/25/2013.   Normal LV size, thickness and systolic function, Stage II diastolic dysfunction, LAE, moderate AS, peak/mean gradients 25/12 mmHg, SUSAN 1.2 cm², RVSP normal.  17. Left parotid mass removed, Dr. Theo Mac at [facility], 07/10/2014.    18. Echo, 03/26/2015.   Normal LV size, wall thickness and EF.  Stage Ia diastolic dysfunction.    Marked LAE.  Moderate aortic stenosis, mean gradient 18 mmHg, SUSAN ~1.3 cm².  VTI 0.39.  Moderate MAC, mild MR.   19. TTE 10/09/2017.  AV moderately sclerotic.   AV leaflet motion restricted.   AV peak gradient = 3.8 m/s.   SUSAN 0.7 cm2.  DI = 0.25.  Severe aortic stenosis. 20. Cardiac catheterization, 11/16/2017.   LMC no significant stenosis.  LAD mid 100% chronic total occlusion.  CX mid 80% stenosis, distal chronic total occlusion.  RCA dominant vessel.  Posterolateral branch mid 100% occlusion.  PDA ostial 80% stenosis.  1.7-2.0 mm vessel.  SVG-OM proximal diffuse 80% stenosis.  Good distal runoff.  OM supplies a very tiny vessel.   SVG Y-graft to PDA and diagonal widely patent.  LIMA-LAD widely patent.  99% stenosis in apical portion of LAD.  approximately 1 mm vessel at this level. 21. 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  22. Echo, 01/11/2018.  EF 60-65%.  Mild concentric LVH.  Normal RV   Severely dilated LA Severe MAC.  Mild MR.  s/p TAVR (Brandi 3 26 mm   AV mean gradient 13 mmHg.  Mild TR.  RVSP 32 mmHg. 23. Outpatient cardiac follow-up, 07/14/2020. Symptoms improved following 14 pound diuresis.  Ankle edema resolved. 24. Labs, 07/14/2020.  ProBNP 63423.  BUN 37.  Creatinine 1.6.  K 4.0.  25. Labs, 07/28/2020.  ProBNP =80121, BUN =23, creatinine =1.4.   26. Echo 08/26/2020:  LV not dilsted.  EF 30%.  Stage III diastolic dysfunction.  Left atrium severely dilated.   RVSP= 61.  S/p TAVR brandi.  SUSAN= 1.27 cm 2.  27. Outpatient cardiac Outpatient Medications:     furosemide (LASIX) 40 MG tablet, Take 1 tablet by mouth every other day (Patient taking differently: Take 40 mg by mouth every other day Cindy Greenfield), Disp: 30 tablet, Rfl: 5    metoprolol succinate (TOPROL XL) 25 MG extended release tablet, Take 2 tablets in the morning and 1 tablet in the afternoon, Disp: 90 tablet, Rfl: 3    potassium chloride (MICRO-K) 10 MEQ extended release capsule, Take 10 mEq by mouth daily, Disp: , Rfl:     lisinopril (PRINIVIL;ZESTRIL) 5 MG tablet, Take 1 tablet by mouth daily, Disp: 30 tablet, Rfl: 5    busPIRone (BUSPAR) 5 MG tablet, TAKE ONE TABLET BY MOUTH EVERY 12 HOURS AS NEEDED FOR ANXIOUSNESS, Disp: , Rfl:     CycloSPORINE (RESTASIS OP), Apply 1 drop to eye 2 times daily, Disp: , Rfl:     Budesonide-Formoterol Fumarate (SYMBICORT IN), Inhale into the lungs, Disp: , Rfl:     nitroGLYCERIN (NITROSTAT) 0.4 MG SL tablet, Place 1 tablet under the tongue every 5 minutes as needed for Chest pain, Disp: 25 tablet, Rfl: 5    tamsulosin (FLOMAX) 0.4 MG capsule, Take 0.4 mg by mouth every evening , Disp: , Rfl: 0    ipratropium-albuterol (DUONEB) 0.5-2.5 (3) MG/3ML SOLN nebulizer solution, , Disp: , Rfl:     atorvastatin (LIPITOR) 40 MG tablet, Take 40 mg by mouth every other day , Disp: , Rfl:     aspirin 81 MG EC tablet, Take 81 mg by mouth daily. , Disp: , Rfl:     ALBUTEROL IN, Inhale 2.5 mg into the lungs as needed. , Disp: , Rfl:     levothyroxine (SYNTHROID) 125 MCG tablet, Take 125 mcg by mouth daily. , Disp: , Rfl:       Note: This report was completed utilizing computer voice recognition software. Every effort has been made to ensure accuracy, however; inadvertent computerized transcription errors may be present.     --Sobeida Ricks MD on 11/10/2020 at 2:25 PM

## 2021-01-04 RX ORDER — METOPROLOL SUCCINATE 25 MG/1
TABLET, EXTENDED RELEASE ORAL
Qty: 90 TABLET | Refills: 3 | Status: SHIPPED
Start: 2021-01-04 | End: 2021-05-05

## 2021-01-21 ENCOUNTER — IMMUNIZATION (OUTPATIENT)
Dept: PRIMARY CARE CLINIC | Age: 86
End: 2021-01-21
Payer: MEDICARE

## 2021-01-21 PROCEDURE — 91300 COVID-19, PFIZER VACCINE 30MCG/0.3ML DOSE: CPT | Performed by: NURSE PRACTITIONER

## 2021-01-21 PROCEDURE — 0001A COVID-19, PFIZER VACCINE 30MCG/0.3ML DOSE: CPT | Performed by: NURSE PRACTITIONER

## 2021-02-01 RX ORDER — LISINOPRIL 5 MG/1
5 TABLET ORAL DAILY
Qty: 30 TABLET | Refills: 5 | Status: SHIPPED
Start: 2021-02-01 | End: 2021-08-04 | Stop reason: SDUPTHER

## 2021-02-11 ENCOUNTER — IMMUNIZATION (OUTPATIENT)
Dept: PRIMARY CARE CLINIC | Age: 86
End: 2021-02-11
Payer: MEDICARE

## 2021-02-11 PROCEDURE — 91300 COVID-19, PFIZER VACCINE 30MCG/0.3ML DOSE: CPT | Performed by: NURSE PRACTITIONER

## 2021-02-11 PROCEDURE — 0002A COVID-19, PFIZER VACCINE 30MCG/0.3ML DOSE: CPT | Performed by: NURSE PRACTITIONER

## 2021-05-06 RX ORDER — METOPROLOL SUCCINATE 25 MG/1
TABLET, EXTENDED RELEASE ORAL
Qty: 90 TABLET | Refills: 5 | Status: SHIPPED | OUTPATIENT
Start: 2021-05-06 | End: 2021-11-04 | Stop reason: SDUPTHER

## 2021-05-11 ENCOUNTER — OFFICE VISIT (OUTPATIENT)
Dept: CARDIOLOGY CLINIC | Age: 86
End: 2021-05-11
Payer: MEDICARE

## 2021-05-11 VITALS
DIASTOLIC BLOOD PRESSURE: 62 MMHG | HEART RATE: 59 BPM | HEIGHT: 70 IN | RESPIRATION RATE: 16 BRPM | SYSTOLIC BLOOD PRESSURE: 118 MMHG | BODY MASS INDEX: 25.15 KG/M2 | WEIGHT: 175.7 LBS

## 2021-05-11 DIAGNOSIS — R06.02 SHORTNESS OF BREATH: ICD-10-CM

## 2021-05-11 DIAGNOSIS — I25.10 CORONARY ARTERY DISEASE INVOLVING NATIVE CORONARY ARTERY OF NATIVE HEART WITHOUT ANGINA PECTORIS: Primary | Chronic | ICD-10-CM

## 2021-05-11 DIAGNOSIS — I50.22 CHRONIC SYSTOLIC CONGESTIVE HEART FAILURE (HCC): ICD-10-CM

## 2021-05-11 LAB
ANION GAP SERPL CALCULATED.3IONS-SCNC: 13 MMOL/L (ref 7–16)
BUN BLDV-MCNC: 25 MG/DL (ref 6–23)
CALCIUM SERPL-MCNC: 9.1 MG/DL (ref 8.6–10.2)
CHLORIDE BLD-SCNC: 99 MMOL/L (ref 98–107)
CO2: 23 MMOL/L (ref 22–29)
CREAT SERPL-MCNC: 1.3 MG/DL (ref 0.7–1.2)
GFR AFRICAN AMERICAN: >60
GFR NON-AFRICAN AMERICAN: 52 ML/MIN/1.73
GLUCOSE BLD-MCNC: 100 MG/DL (ref 74–99)
POTASSIUM SERPL-SCNC: 4.7 MMOL/L (ref 3.5–5)
PRO-BNP: 4986 PG/ML (ref 0–450)
SODIUM BLD-SCNC: 135 MMOL/L (ref 132–146)

## 2021-05-11 PROCEDURE — 99212 OFFICE O/P EST SF 10 MIN: CPT | Performed by: INTERNAL MEDICINE

## 2021-05-11 PROCEDURE — 93000 ELECTROCARDIOGRAM COMPLETE: CPT | Performed by: INTERNAL MEDICINE

## 2021-05-11 RX ORDER — FUROSEMIDE 40 MG/1
40 TABLET ORAL EVERY OTHER DAY
Qty: 30 TABLET | Refills: 5 | Status: SHIPPED | OUTPATIENT
Start: 2021-05-11 | End: 2022-05-26 | Stop reason: SDUPTHER

## 2021-05-11 NOTE — PROGRESS NOTES
Patient was seen if office today for BMP and BNP per Dr. Nassar Inch. Patient tolerated well in right arm.

## 2021-05-13 ENCOUNTER — TELEPHONE (OUTPATIENT)
Dept: CARDIOLOGY CLINIC | Age: 86
End: 2021-05-13

## 2021-05-13 NOTE — TELEPHONE ENCOUNTER
MD Jackelin Castro   Caller: Unspecified (Today,  6:13 AM)               Jackelin tell the patient his labs are fine we are not changing medicines right now and we will see him in 6 months     Contacted patient with lab results and recommendations per Dr. Azael Faulkner. He verbalized understanding. Letter forwarded to Dr. Adryan Eid.

## 2021-07-01 ENCOUNTER — TELEPHONE (OUTPATIENT)
Dept: CARDIOLOGY CLINIC | Age: 86
End: 2021-07-01

## 2021-07-01 NOTE — TELEPHONE ENCOUNTER
GLADYS pt. Patient seen at [facility] yesterday and his BP was 96/57. Patient calling to see if he should be concerned. Please advise.

## 2021-07-01 NOTE — TELEPHONE ENCOUNTER
Please instruct patient to take blood pressure daily over the weekend and call us with results on Tuesday.

## 2021-07-06 ENCOUNTER — TELEPHONE (OUTPATIENT)
Dept: CARDIOLOGY CLINIC | Age: 86
End: 2021-07-06

## 2021-07-06 NOTE — TELEPHONE ENCOUNTER
patient  calling with blood pressure readings over the weekend (see encounter from 7/1)    7/2 -133/62 (58)  7/3- 128/62 (60)  7/4- 137/66 (58)  7/5- 130/65 (66)  7/6- 131/69 (64)    Please review and advise

## 2021-08-04 RX ORDER — LISINOPRIL 5 MG/1
5 TABLET ORAL DAILY
Qty: 90 TABLET | Refills: 3 | Status: SHIPPED
Start: 2021-08-04 | End: 2022-07-08

## 2021-11-01 ENCOUNTER — OFFICE VISIT (OUTPATIENT)
Dept: CARDIOLOGY CLINIC | Age: 86
End: 2021-11-01
Payer: MEDICARE

## 2021-11-01 VITALS
WEIGHT: 163.4 LBS | HEIGHT: 71 IN | BODY MASS INDEX: 22.88 KG/M2 | RESPIRATION RATE: 16 BRPM | DIASTOLIC BLOOD PRESSURE: 62 MMHG | HEART RATE: 79 BPM | SYSTOLIC BLOOD PRESSURE: 110 MMHG

## 2021-11-01 DIAGNOSIS — E03.8 OTHER SPECIFIED HYPOTHYROIDISM: Chronic | ICD-10-CM

## 2021-11-01 DIAGNOSIS — I10 ESSENTIAL HYPERTENSION: ICD-10-CM

## 2021-11-01 DIAGNOSIS — N40.1 BENIGN PROSTATIC HYPERPLASIA WITH URINARY OBSTRUCTION: Chronic | ICD-10-CM

## 2021-11-01 DIAGNOSIS — N13.8 BENIGN PROSTATIC HYPERPLASIA WITH URINARY OBSTRUCTION: Chronic | ICD-10-CM

## 2021-11-01 DIAGNOSIS — E78.2 MIXED HYPERLIPIDEMIA: Chronic | ICD-10-CM

## 2021-11-01 DIAGNOSIS — I25.10 CORONARY ARTERY DISEASE INVOLVING NATIVE CORONARY ARTERY OF NATIVE HEART WITHOUT ANGINA PECTORIS: Primary | ICD-10-CM

## 2021-11-01 DIAGNOSIS — K52.9 GASTROENTERITIS: ICD-10-CM

## 2021-11-01 DIAGNOSIS — I35.0 SEVERE AORTIC STENOSIS: ICD-10-CM

## 2021-11-01 DIAGNOSIS — Z95.2 S/P TAVR (TRANSCATHETER AORTIC VALVE REPLACEMENT): ICD-10-CM

## 2021-11-01 DIAGNOSIS — I35.0 NODULAR CALCIFIC AORTIC VALVE STENOSIS: ICD-10-CM

## 2021-11-01 DIAGNOSIS — Z95.1 S/P CABG X 4: ICD-10-CM

## 2021-11-01 PROCEDURE — 93000 ELECTROCARDIOGRAM COMPLETE: CPT | Performed by: INTERNAL MEDICINE

## 2021-11-01 PROCEDURE — 99214 OFFICE O/P EST MOD 30 MIN: CPT | Performed by: INTERNAL MEDICINE

## 2021-11-01 NOTE — PROGRESS NOTES
Out Patient CARDIOLOGY FOLLOW UP    Name: Adolph Brice    Age: 80 y.o. Date of Service: 11/1/2021      Referring Physician: No admitting provider for patient encounter. Chief Complaint   Patient presents with    Coronary Artery Disease     6 month ov-    Shortness of Breath        History of Present Illness: 70-year-old male with history of CABG in 2004 with LIMA to LAD, SVG to diagonal 1, SVG to OM1, SVG to posterior lateral branch from the left circumflex. He also has TIA, history of DVT, COPD, hypertension, hyperlipidemia, aortic valve disease status post TAVR in December 2017. Chart review shows he had episode of vasovagal syncope in 2020 and has had no recurrence since. He presented for follow-up visit today with the daughter. Patient reports he is stable and currently denies any chest pain, dyspnea on exertion, lower extremity edema orthopnea or paroxysmal nocturnal dyspnea. His main concern is lower leg pains that have been chronic. His last echocardiogram in August 2020 revealed EF of 30%. He is not on LifeVest at this time. He is on low-dose beta-blocker and low-dose lisinopril. I discussed at length guideline directed medical therapy and LifeVest with the patient and the daughter. Since the patient reports he has been stable for some time without any symptoms, he is not interested in medication changes at this time and also not interested in device therapy/LifeVest.  Subsequently no medication changes was made but patient is advised to follow-up in 3 months to see if we can optimize his guideline directed medical therapy. I mentioned to him that the metoprolol succinate and lisinopril will need to be uptitrated and additional medications to be added in order to optimize his systolic function. [facility] was also discussed.   The patient will follow up in 3 months to see if he would be interested in up titration of guideline directed medical therapy and or if he would like to discuss device therapy.         history:  1. Presentation with atypical chest pain, 02/2004.  Abnormal MPS.  OP cardiac catheterization  severe LAD and CX stenoses.  The RCA no significant stenosis EF normal.  2. CABG, 03/2004  Dr. Kraig Espinoza.  LIMA-LAD, SVG-D1, SVG-OM1 and SVG-posterolateral branch of the CX. 3. TIA - transient visual loss, [address] [id number]. 4. Surgical history back surgery and right hip replacement.  Right foot drop developed after the hip surgery.  Redo hip surgery  2004.    5. DVT circa 50 years ago. 6. COPD/reactive airway disease.  Cigarette abuse, 7.5 pack years.  Abstinent since 01/01/2004. 7. Essential hypertension. 8. Hyperlipidemia.    9. Lexiscan MPS, 04/09/2007.  Small  mostly fixed  defect  inferolateral wall at basal and mid levels suggesting previous MI. Hypokinesis.  EF 72%. 10. Echo, 04/10/2007.  Normal LV size, wall motion and function with EF >60% and Stage I diastolic dysfunction.  Moderate PEDRO.  Aortic sclerosis with mild stenosis and SUSAN 2.1 cm2.   Mild MR, TR and PHTN.    11. Family history : multiple members  heart disease beginning in  52s and 62s.    12. No drug allergies.    13. Lexiscan MPS, 07/06/2010. Wall motion normal, EF 67%,   Moderate size,  moderate severity, partially reversible lateral perfusion abnormality. 14. Chronic kidney disease.  BUN 20, Cr 1.6, GFR 44 - 12/12/2012. 15. Lexiscan MPS, 03/26/2013.  Inferolateral infarction.   Small distal anteroseptal and apical reversible defect.  EF= 72%.  TID present   Intermediate to high risk study. 16. Echo, 04/25/2013.   Normal LV size, thickness and systolic function, Stage II diastolic dysfunction, LAE, moderate AS, peak/mean gradients 25/12 mmHg, SUSAN 1.2 cm², RVSP normal.  17. Left parotid mass removed, Dr. Roger Turpin at [facility], 07/10/2014.    18.  Echo, 03/26/2015.   Normal LV size, wall thickness and EF.  Stage Ia diastolic dysfunction.    Marked LAE.  Moderate aortic stenosis, mean gradient 18 mmHg, SUSAN ~1.3 cm².  VTI 0.39.  Moderate MAC, mild MR.   19. TTE 10/09/2017.  AV moderately sclerotic.   AV leaflet motion restricted.   AV peak gradient = 3.8 m/s.   SUSAN 0.7 cm2.  DI = 0.25.  Severe aortic stenosis. 20. Cardiac catheterization, 11/16/2017.   LMC no significant stenosis.  LAD mid 100% chronic total occlusion.  CX mid 80% stenosis, distal chronic total occlusion.  RCA dominant vessel.  Posterolateral branch mid 100% occlusion.  PDA ostial 80% stenosis.  1.7-2.0 mm vessel.  SVG-OM proximal diffuse 80% stenosis.  Good distal runoff.  OM supplies a very tiny vessel.   SVG Y-graft to PDA and diagonal widely patent.  LIMA-LAD widely patent.  99% stenosis in apical portion of LAD.  approximately 1 mm vessel at this level. 21. 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  22. Echo, 01/11/2018.  EF 60-65%.  Mild concentric LVH.  Normal RV   Severely dilated LA Severe MAC.  Mild MR.  s/p TAVR (Brandi 3 26 mm   AV mean gradient 13 mmHg.  Mild TR.  RVSP 32 mmHg. 23. Outpatient cardiac follow-up, 07/14/2020. Symptoms improved following 14 pound diuresis.  Ankle edema resolved. 24. Labs, 07/14/2020.  ProBNP 21979.  BUN 37.  Creatinine 1.6.  K 4.0.  25. Labs, 07/28/2020.  ProBNP =63286, BUN =23, creatinine =1.4.   26. Echo 08/26/2020:  LV not dilsted.  EF 30%.  Stage III diastolic dysfunction.  Left atrium severely dilated.  RVSP= 61.  S/p TAVR brandi.  SUSAN= 1.27 cm 2.  27. Outpatient cardiac assessment 08/21/2020: Feels better.  Leg edema resolved.  Toprol XL increased to 50 a.m. and 25 mg PM.  Labs drawn  28. OP cardiac assessment 09/08/2020: Fatigue.  Possibly intravascular volume depleted.  Lasix decreased to alternate day.  BMP drawn.  Episode of vasovagal syncope.   29. Labs 09/09/2020: BUN 41.  Creatinine 1.7.  GFR 38.  proBNP 9065 subsequently Lasix decreased to MWF       Review of Systems:   Cardiac: As per HPI  General: Denies fever or chills  Pulmonary: As per HPI  HEENT: Denies runny nose  GI: No complaints  : No complaints  Endocrine: Denies night sweats  Musculoskeletal: No complaints  Skin: Dry skin  Neuro: No complaints  Psych: Denies depression    Past Medical History:  Past Medical History:   Diagnosis Date    Asthma     CAD (coronary artery disease)     COPD (chronic obstructive pulmonary disease) (HCC)     HIGH CHOLESTEROL     History of DVT (deep vein thrombosis)     Hx of blood clots     Hypertension     Thyroid disease     TIA (transient ischemic attack)        Past Surgical History:  Past Surgical History:   Procedure Laterality Date    APPENDECTOMY      BACK SURGERY      lumbar    CARDIAC CATHETERIZATION  2017    Dr Colon Beni TEST      2013    CAROTID ENDARTERECTOMY  [id number]    CATARACT REMOVAL      CHOLECYSTECTOMY      CORONARY ARTERY BYPASS GRAFT  3/2004    LIMA-LAD, SVG-D1, SVG-OM1, SVG-posterolateral branch of CX    CYST REMOVAL      DIAGNOSTIC CARDIAC CATH LAB PROCEDURE  2004    EYE SURGERY      GALLBLADDER SURGERY      HAND SURGERY      HIP SURGERY      right    JOINT REPLACEMENT      OTHER SURGICAL HISTORY  2017    Dr. Edilma Howard and Dr. Josh SAUL Brandi 26mm valve    TONSILLECTOMY      TRANSTHORACIC ECHOCARDIOGRAM         Family History:  Family History   Problem Relation Age of Onset    Heart Disease Mother     Heart Disease Father        Social History:  Social History     Socioeconomic History    Marital status:      Spouse name: Not on file    Number of children: Not on file    Years of education: Not on file    Highest education level: Not on file   Occupational History    Not on file   Tobacco Use    Smoking status: Former Smoker     Packs/day: 0.25     Years: 30.00     Pack years: 7.50     Types: Cigarettes     Quit date: 2004     Years since quittin.8    Smokeless tobacco: Never Used   Vaping Use    Vaping Use: Never used   Substance and Sexual Activity    Alcohol use:  Yes     Alcohol/week: 4.0 standard drinks     Types: 2 Glasses of wine, 2 Cans of beer per week    Drug use: No    Sexual activity: Never   Other Topics Concern    Not on file   Social History Narrative    Not on file     Social Determinants of Health     Financial Resource Strain:     Difficulty of Paying Living Expenses:    Food Insecurity:     Worried About Running Out of Food in the Last Year:     [address] in the Last Year:    Transportation Needs:     Lack of Transportation (Medical):  Lack of Transportation (Non-Medical):    Physical Activity:     Days of Exercise per Week:     Minutes of Exercise per Session:    Stress:     Feeling of Stress :    Social Connections:     Frequency of Communication with Friends and Family:     Frequency of Social Gatherings with Friends and Family:     Attends [employer] Services:     Active Member of Clubs or Organizations:     Attends Club or Organization Meetings:     Marital Status:    Intimate Partner Violence:     Fear of Current or Ex-Partner:     Emotionally Abused:     Physically Abused:     Sexually Abused: Allergies:  No Known Allergies    Home Medications:  Prior to Admission medications    Medication Sig Start Date End Date Taking?  Authorizing Provider   lisinopril (PRINIVIL;ZESTRIL) 5 MG tablet Take 1 tablet by mouth daily 8/4/21  Yes Saida Roman MD   Polyvinyl Alcohol-Povidone (REFRESH OP) Apply to eye   Yes Historical Provider, MD   furosemide (LASIX) 40 MG tablet Take 1 tablet by mouth every other day 5/11/21  Yes Jim Cerda MD   metoprolol succinate (TOPROL XL) 25 MG extended release tablet TAKE 2 TABLETS BY MOUTH IN THE MORNING AND 1 TABLET IN THE AFTERNOON 5/6/21  Yes Jim Cerda MD   potassium chloride (MICRO-K) 10 MEQ extended release capsule Take 10 mEq by mouth daily 7/29/20  Yes Historical Provider, MD   busPIRone (BUSPAR) 5 MG tablet TAKE ONE TABLET BY MOUTH EVERY 12 HOURS AS NEEDED FOR ANXIOUSNESS 6/26/20  Yes Historical Provider, MD   Budesonide-Formoterol Fumarate (SYMBICORT IN) Inhale into the lungs   Yes Historical Provider, MD   nitroGLYCERIN (NITROSTAT) 0.4 MG SL tablet Place 1 tablet under the tongue every 5 minutes as needed for Chest pain 10/11/16  Yes Zainab Marie MD   tamsulosin [facility]) 0.4 MG capsule Take 0.4 mg by mouth every evening  4/13/15  Yes Historical Provider, MD   atorvastatin (LIPITOR) 40 MG tablet Take 40 mg by mouth every other day    Yes Historical Provider, MD   aspirin 81 MG EC tablet Take 81 mg by mouth daily. Yes Historical Provider, MD   ALBUTEROL IN Inhale 2.5 mg into the lungs as needed. Yes Historical Provider, MD   levothyroxine (SYNTHROID) 125 MCG tablet Take 125 mcg by mouth daily.      Yes Historical Provider, MD   ipratropium-albuterol (DUONEB) 0.5-2.5 (3) MG/3ML SOLN nebulizer solution  5/8/13   Historical Provider, MD       Current Medications:  Current Outpatient Medications   Medication Sig Dispense Refill    lisinopril (PRINIVIL;ZESTRIL) 5 MG tablet Take 1 tablet by mouth daily 90 tablet 3    Polyvinyl Alcohol-Povidone (REFRESH OP) Apply to eye      furosemide (LASIX) 40 MG tablet Take 1 tablet by mouth every other day 30 tablet 5    metoprolol succinate (TOPROL XL) 25 MG extended release tablet TAKE 2 TABLETS BY MOUTH IN THE MORNING AND 1 TABLET IN THE AFTERNOON 90 tablet 5    potassium chloride (MICRO-K) 10 MEQ extended release capsule Take 10 mEq by mouth daily      busPIRone (BUSPAR) 5 MG tablet TAKE ONE TABLET BY MOUTH EVERY 12 HOURS AS NEEDED FOR ANXIOUSNESS      Budesonide-Formoterol Fumarate (SYMBICORT IN) Inhale into the lungs      nitroGLYCERIN (NITROSTAT) 0.4 MG SL tablet Place 1 tablet under the tongue every 5 minutes as needed for Chest pain 25 tablet 5    tamsulosin (FLOMAX) 0.4 MG capsule Take 0.4 mg by mouth every evening   0    atorvastatin (LIPITOR) 40 MG tablet Take 40 mg by mouth every other day       aspirin 81 MG EC tablet Take 81 mg by mouth daily.  ALBUTEROL IN Inhale 2.5 mg into the lungs as needed.  levothyroxine (SYNTHROID) 125 MCG tablet Take 125 mcg by mouth daily.  ipratropium-albuterol (DUONEB) 0.5-2.5 (3) MG/3ML SOLN nebulizer solution  (Patient not taking: Reported on 11/1/2021)       No current facility-administered medications for this visit. Physical Exam:  /62   Pulse 79   Resp 16   Ht 5' 11\" (1.803 m)   Wt 163 lb 6.4 oz (74.1 kg)   BMI 22.79 kg/m²   Wt Readings from Last 3 Encounters:   11/01/21 163 lb 6.4 oz (74.1 kg)   05/11/21 175 lb 11.2 oz (79.7 kg)   11/10/20 177 lb 9.6 oz (80.6 kg)       Appearance: Alert and oriented x3 not in acute distress. Skin: Dry skin  Head: Atraumatic  Eyes: Intact extraocular muscles   ENMT: Mucous membranes are moist  Neck: Supple  Lungs: Clear to auscultation  Cardiac: Normal S1 and S2  Abdomen: Protuberant  Extremities: Intact range of motion  Neurologic: No focal neurological deficits  Peripheral Pulses: 2+ peripheral pulses    Intake/Output:  No intake or output data in the 24 hours ending 11/01/21 1838  @[facility]@    Laboratory Tests:  No results for input(s): NA, K, CL, CO2, BUN, CREATININE, GLUCOSE, CALCIUM in the last 72 hours. No results found for: MG  No results for input(s): ALKPHOS, ALT, AST, PROT, BILITOT, BILIDIR, LABALBU in the last 72 hours. No results for input(s): WBC, RBC, HGB, HCT, MCV, MCH, MCHC, RDW, PLT, MPV in the last 72 hours. Lab Results   Component Value Date    CKTOTAL 202 05/04/2013    CKMB 2.3 05/04/2013    TROPONINI <0.01 06/04/2014    TROPONINI 0.02 05/04/2013    TROPONINI 0.01 10/17/2011     No results for input(s): CKTOTAL, CKMB, CKMBINDEX, TROPHS in the last 72 hours.   Lab Results   Component Value Date    INR 1.0 12/12/2017    INR 1.0 11/14/2017    INR 1.0 05/04/2013    PROTIME 11.3 12/12/2017    PROTIME 11.6 11/14/2017    PROTIME 11.3 05/04/2013     No results found for: TSHFT4, TSH  Lab Results   Component Value Date LABA1C 7.2 (H) 12/12/2017     No results found for: EAG  Lab Results   Component Value Date    CHOL 185 10/17/2011     Lab Results   Component Value Date    TRIG 176 (H) 10/17/2011     Lab Results   Component Value Date    HDL 36.8 (A) 10/17/2011     Lab Results   Component Value Date    LDLCALC 113 (H) 10/17/2011     No results found for: LABVLDL, VLDL  No results found for: CHOLHDLRATIO  No results for input(s): PROBNP in the last 72 hours. Cardiac Tests:  EKG reviewed (EKG date: Atrial fibrillation 79 bpm nonspecific ST-T wave changes.):      Echocardiogram reviewed: 8/2020    Summary   Global hypokinesis severe   Ejection fraction contrast biplane =30%. Stage III diastolic dysfunction. The left atrium is severely dilated. L atrial pressure elevated   Moderate to severe mitral regurgitation central jet. s/p TAVR asuncion   SUSAN= 1.27 cm2   Mild aortic regurgitation. Pulmonary hypertension is moderate . No evidence of pericardial effusion. Stress test reviewed:      Cardiac catheterization reviewed: 11/2017  ANGIOGRAPHIC RESULTS:  1. LEFT MAIN:  No angiographically significant stenosis. 2.  LAD:  Mid 100% chronic total occlusion. 3.  CIRCUMFLEX:  Mid 80% stenosis, distal chronic total occlusion. 4.  RIGHT CORONARY ARTERY:  Dominant vessel. Posterolateral branch has a  mid 100% occlusion. PDA has an ostial 80% stenosis. This is a 1.7 to 2 mm  vessel. 5.  SVG TO OBTUSE MARGINAL:  Proximal diffuse 80% stenosis. Good distal  runoff. The obtuse marginal supplies as a very tiny vessel. 6. SVGY graft to the PDA and diagonal:  Both branches are widely patent  and both have good distal runoff. 7. LIMA TO THE LAD:  Widely patent. There is a 99% stenosis in the apical  portion of the LAD. This is approximately a 1 mm vessel at this level.       CXR reviewed: The ASCVD Risk score (Valente Aquino., et al., 2013) failed to calculate for the following reasons:     The 2013 ASCVD risk score is

## 2021-11-04 RX ORDER — METOPROLOL SUCCINATE 25 MG/1
TABLET, EXTENDED RELEASE ORAL
Qty: 90 TABLET | Refills: 5 | Status: SHIPPED | OUTPATIENT
Start: 2021-11-04

## 2022-05-26 RX ORDER — FUROSEMIDE 40 MG/1
40 TABLET ORAL EVERY OTHER DAY
Qty: 30 TABLET | Refills: 5 | Status: SHIPPED
Start: 2022-05-26 | End: 2022-06-03 | Stop reason: SDUPTHER

## 2022-06-03 RX ORDER — FUROSEMIDE 40 MG/1
40 TABLET ORAL EVERY OTHER DAY
Qty: 30 TABLET | Refills: 5 | Status: SHIPPED | OUTPATIENT
Start: 2022-06-03

## 2022-07-11 RX ORDER — LISINOPRIL 5 MG/1
TABLET ORAL
Qty: 90 TABLET | Refills: 3 | Status: SHIPPED | OUTPATIENT
Start: 2022-07-11

## 2022-11-07 ENCOUNTER — HOSPITAL ENCOUNTER (INPATIENT)
Age: 87
LOS: 1 days | Discharge: HOME | DRG: 352 | End: 2022-11-08
Attending: EMERGENCY MEDICINE | Admitting: SURGERY
Payer: MEDICARE

## 2022-11-07 ENCOUNTER — APPOINTMENT (OUTPATIENT)
Dept: CT IMAGING | Age: 87
DRG: 352 | End: 2022-11-07
Payer: MEDICARE

## 2022-11-07 ENCOUNTER — ANESTHESIA (OUTPATIENT)
Dept: OPERATING ROOM | Age: 87
DRG: 352 | End: 2022-11-07
Payer: MEDICARE

## 2022-11-07 ENCOUNTER — ANESTHESIA EVENT (OUTPATIENT)
Dept: OPERATING ROOM | Age: 87
DRG: 352 | End: 2022-11-07
Payer: MEDICARE

## 2022-11-07 DIAGNOSIS — K40.90 INGUINAL HERNIA, RIGHT: Primary | ICD-10-CM

## 2022-11-07 DIAGNOSIS — K40.30 INCARCERATED INGUINAL HERNIA: ICD-10-CM

## 2022-11-07 LAB
ABO/RH: NORMAL
ALBUMIN SERPL-MCNC: 3.9 G/DL (ref 3.5–5.2)
ALP BLD-CCNC: 87 U/L (ref 40–129)
ALT SERPL-CCNC: 16 U/L (ref 0–40)
ANION GAP SERPL CALCULATED.3IONS-SCNC: 13 MMOL/L (ref 7–16)
ANTIBODY SCREEN: NORMAL
AST SERPL-CCNC: 23 U/L (ref 0–39)
BASOPHILS ABSOLUTE: 0.02 E9/L (ref 0–0.2)
BASOPHILS RELATIVE PERCENT: 0.2 % (ref 0–2)
BILIRUB SERPL-MCNC: 0.8 MG/DL (ref 0–1.2)
BUN BLDV-MCNC: 42 MG/DL (ref 6–23)
CALCIUM SERPL-MCNC: 9.4 MG/DL (ref 8.6–10.2)
CHLORIDE BLD-SCNC: 100 MMOL/L (ref 98–107)
CO2: 21 MMOL/L (ref 22–29)
CREAT SERPL-MCNC: 1.7 MG/DL (ref 0.7–1.2)
EOSINOPHILS ABSOLUTE: 0.03 E9/L (ref 0.05–0.5)
EOSINOPHILS RELATIVE PERCENT: 0.3 % (ref 0–6)
GFR SERPL CREATININE-BSD FRML MDRD: 37 ML/MIN/1.73
GLUCOSE BLD-MCNC: 121 MG/DL (ref 74–99)
HCT VFR BLD CALC: 42.7 % (ref 37–54)
HEMOGLOBIN: 14.2 G/DL (ref 12.5–16.5)
IMMATURE GRANULOCYTES #: 0.05 E9/L
IMMATURE GRANULOCYTES %: 0.5 % (ref 0–5)
LIPASE: 16 U/L (ref 13–60)
LYMPHOCYTES ABSOLUTE: 0.95 E9/L (ref 1.5–4)
LYMPHOCYTES RELATIVE PERCENT: 9.8 % (ref 20–42)
MCH RBC QN AUTO: 29.7 PG (ref 26–35)
MCHC RBC AUTO-ENTMCNC: 33.3 % (ref 32–34.5)
MCV RBC AUTO: 89.3 FL (ref 80–99.9)
MONOCYTES ABSOLUTE: 0.72 E9/L (ref 0.1–0.95)
MONOCYTES RELATIVE PERCENT: 7.4 % (ref 2–12)
NEUTROPHILS ABSOLUTE: 7.92 E9/L (ref 1.8–7.3)
NEUTROPHILS RELATIVE PERCENT: 81.8 % (ref 43–80)
PDW BLD-RTO: 13.2 FL (ref 11.5–15)
PLATELET # BLD: 239 E9/L (ref 130–450)
PMV BLD AUTO: 8.9 FL (ref 7–12)
POTASSIUM SERPL-SCNC: 5 MMOL/L (ref 3.5–5)
RBC # BLD: 4.78 E12/L (ref 3.8–5.8)
REASON FOR REJECTION: NORMAL
REJECTED TEST: NORMAL
SODIUM BLD-SCNC: 134 MMOL/L (ref 132–146)
TOTAL PROTEIN: 6.6 G/DL (ref 6.4–8.3)
WBC # BLD: 9.7 E9/L (ref 4.5–11.5)

## 2022-11-07 PROCEDURE — 36415 COLL VENOUS BLD VENIPUNCTURE: CPT

## 2022-11-07 PROCEDURE — 6360000002 HC RX W HCPCS: Performed by: STUDENT IN AN ORGANIZED HEALTH CARE EDUCATION/TRAINING PROGRAM

## 2022-11-07 PROCEDURE — 3700000001 HC ADD 15 MINUTES (ANESTHESIA): Performed by: SURGERY

## 2022-11-07 PROCEDURE — 2500000003 HC RX 250 WO HCPCS: Performed by: SURGERY

## 2022-11-07 PROCEDURE — 6370000000 HC RX 637 (ALT 250 FOR IP): Performed by: STUDENT IN AN ORGANIZED HEALTH CARE EDUCATION/TRAINING PROGRAM

## 2022-11-07 PROCEDURE — 6360000002 HC RX W HCPCS: Performed by: ANESTHESIOLOGIST ASSISTANT

## 2022-11-07 PROCEDURE — 6370000000 HC RX 637 (ALT 250 FOR IP)

## 2022-11-07 PROCEDURE — 2500000003 HC RX 250 WO HCPCS

## 2022-11-07 PROCEDURE — 86901 BLOOD TYPING SEROLOGIC RH(D): CPT

## 2022-11-07 PROCEDURE — C1781 MESH (IMPLANTABLE): HCPCS | Performed by: SURGERY

## 2022-11-07 PROCEDURE — 6360000002 HC RX W HCPCS

## 2022-11-07 PROCEDURE — 6360000002 HC RX W HCPCS: Performed by: ANESTHESIOLOGY

## 2022-11-07 PROCEDURE — 2720000010 HC SURG SUPPLY STERILE: Performed by: SURGERY

## 2022-11-07 PROCEDURE — 80053 COMPREHEN METABOLIC PANEL: CPT

## 2022-11-07 PROCEDURE — 86900 BLOOD TYPING SEROLOGIC ABO: CPT

## 2022-11-07 PROCEDURE — 3600000014 HC SURGERY LEVEL 4 ADDTL 15MIN: Performed by: SURGERY

## 2022-11-07 PROCEDURE — 0YU54JZ SUPPLEMENT RIGHT INGUINAL REGION WITH SYNTHETIC SUBSTITUTE, PERCUTANEOUS ENDOSCOPIC APPROACH: ICD-10-PCS | Performed by: SURGERY

## 2022-11-07 PROCEDURE — 2580000003 HC RX 258

## 2022-11-07 PROCEDURE — 3700000000 HC ANESTHESIA ATTENDED CARE: Performed by: SURGERY

## 2022-11-07 PROCEDURE — 2500000003 HC RX 250 WO HCPCS: Performed by: ANESTHESIOLOGIST ASSISTANT

## 2022-11-07 PROCEDURE — 7100000000 HC PACU RECOVERY - FIRST 15 MIN: Performed by: SURGERY

## 2022-11-07 PROCEDURE — 86850 RBC ANTIBODY SCREEN: CPT

## 2022-11-07 PROCEDURE — 2709999900 HC NON-CHARGEABLE SUPPLY: Performed by: SURGERY

## 2022-11-07 PROCEDURE — 7100000001 HC PACU RECOVERY - ADDTL 15 MIN: Performed by: SURGERY

## 2022-11-07 PROCEDURE — 83690 ASSAY OF LIPASE: CPT

## 2022-11-07 PROCEDURE — 74176 CT ABD & PELVIS W/O CONTRAST: CPT

## 2022-11-07 PROCEDURE — 3600000004 HC SURGERY LEVEL 4 BASE: Performed by: SURGERY

## 2022-11-07 PROCEDURE — 96374 THER/PROPH/DIAG INJ IV PUSH: CPT

## 2022-11-07 PROCEDURE — 85025 COMPLETE CBC W/AUTO DIFF WBC: CPT

## 2022-11-07 PROCEDURE — 2580000003 HC RX 258: Performed by: STUDENT IN AN ORGANIZED HEALTH CARE EDUCATION/TRAINING PROGRAM

## 2022-11-07 PROCEDURE — 99285 EMERGENCY DEPT VISIT HI MDM: CPT

## 2022-11-07 PROCEDURE — 2140000000 HC CCU INTERMEDIATE R&B

## 2022-11-07 DEVICE — 3DMAX LIGHT MESH, 10.3 CM X 15.7 CM (4.1" X 6.2"), RIGHT, LARGE
Type: IMPLANTABLE DEVICE | Site: GROIN | Status: FUNCTIONAL
Brand: 3DMAX

## 2022-11-07 RX ORDER — LABETALOL HYDROCHLORIDE 5 MG/ML
5 INJECTION, SOLUTION INTRAVENOUS
Status: DISCONTINUED | OUTPATIENT
Start: 2022-11-07 | End: 2022-11-07 | Stop reason: HOSPADM

## 2022-11-07 RX ORDER — ONDANSETRON 4 MG/1
4 TABLET, ORALLY DISINTEGRATING ORAL EVERY 8 HOURS PRN
Status: DISCONTINUED | OUTPATIENT
Start: 2022-11-07 | End: 2022-11-08 | Stop reason: HOSPADM

## 2022-11-07 RX ORDER — LIDOCAINE HYDROCHLORIDE 20 MG/ML
INJECTION, SOLUTION INTRAVENOUS PRN
Status: DISCONTINUED | OUTPATIENT
Start: 2022-11-07 | End: 2022-11-07 | Stop reason: SDUPTHER

## 2022-11-07 RX ORDER — SODIUM CHLORIDE 9 MG/ML
INJECTION, SOLUTION INTRAVENOUS PRN
Status: DISCONTINUED | OUTPATIENT
Start: 2022-11-07 | End: 2022-11-07 | Stop reason: SDUPTHER

## 2022-11-07 RX ORDER — TRAMADOL HYDROCHLORIDE 50 MG/1
50 TABLET ORAL
Status: DISCONTINUED | OUTPATIENT
Start: 2022-11-07 | End: 2022-11-07 | Stop reason: HOSPADM

## 2022-11-07 RX ORDER — METOPROLOL SUCCINATE 50 MG/1
25 TABLET, EXTENDED RELEASE ORAL DAILY
Status: DISCONTINUED | OUTPATIENT
Start: 2022-11-07 | End: 2022-11-08 | Stop reason: HOSPADM

## 2022-11-07 RX ORDER — HYDRALAZINE HYDROCHLORIDE 20 MG/ML
5 INJECTION INTRAMUSCULAR; INTRAVENOUS
Status: DISCONTINUED | OUTPATIENT
Start: 2022-11-07 | End: 2022-11-07 | Stop reason: HOSPADM

## 2022-11-07 RX ORDER — ONDANSETRON 2 MG/ML
INJECTION INTRAMUSCULAR; INTRAVENOUS PRN
Status: DISCONTINUED | OUTPATIENT
Start: 2022-11-07 | End: 2022-11-07 | Stop reason: SDUPTHER

## 2022-11-07 RX ORDER — SODIUM CHLORIDE 0.9 % (FLUSH) 0.9 %
5-40 SYRINGE (ML) INJECTION EVERY 12 HOURS SCHEDULED
Status: DISCONTINUED | OUTPATIENT
Start: 2022-11-07 | End: 2022-11-07 | Stop reason: HOSPADM

## 2022-11-07 RX ORDER — TAMSULOSIN HYDROCHLORIDE 0.4 MG/1
0.4 CAPSULE ORAL EVERY EVENING
Status: DISCONTINUED | OUTPATIENT
Start: 2022-11-07 | End: 2022-11-08 | Stop reason: HOSPADM

## 2022-11-07 RX ORDER — MIDAZOLAM HYDROCHLORIDE 2 MG/2ML
2 INJECTION, SOLUTION INTRAMUSCULAR; INTRAVENOUS
Status: DISCONTINUED | OUTPATIENT
Start: 2022-11-07 | End: 2022-11-07 | Stop reason: HOSPADM

## 2022-11-07 RX ORDER — LEVOTHYROXINE SODIUM 0.12 MG/1
125 TABLET ORAL DAILY
Status: DISCONTINUED | OUTPATIENT
Start: 2022-11-07 | End: 2022-11-08 | Stop reason: HOSPADM

## 2022-11-07 RX ORDER — ONDANSETRON 2 MG/ML
4 INJECTION INTRAMUSCULAR; INTRAVENOUS
Status: DISCONTINUED | OUTPATIENT
Start: 2022-11-07 | End: 2022-11-07 | Stop reason: HOSPADM

## 2022-11-07 RX ORDER — SODIUM CHLORIDE 9 MG/ML
25 INJECTION, SOLUTION INTRAVENOUS PRN
Status: DISCONTINUED | OUTPATIENT
Start: 2022-11-07 | End: 2022-11-07 | Stop reason: SDUPTHER

## 2022-11-07 RX ORDER — SODIUM CHLORIDE, SODIUM LACTATE, POTASSIUM CHLORIDE, CALCIUM CHLORIDE 600; 310; 30; 20 MG/100ML; MG/100ML; MG/100ML; MG/100ML
INJECTION, SOLUTION INTRAVENOUS CONTINUOUS
Status: DISCONTINUED | OUTPATIENT
Start: 2022-11-07 | End: 2022-11-08

## 2022-11-07 RX ORDER — ETOMIDATE 2 MG/ML
INJECTION INTRAVENOUS PRN
Status: DISCONTINUED | OUTPATIENT
Start: 2022-11-07 | End: 2022-11-07 | Stop reason: SDUPTHER

## 2022-11-07 RX ORDER — HEPARIN SODIUM 10000 [USP'U]/ML
5000 INJECTION, SOLUTION INTRAVENOUS; SUBCUTANEOUS EVERY 8 HOURS SCHEDULED
Status: DISCONTINUED | OUTPATIENT
Start: 2022-11-08 | End: 2022-11-08 | Stop reason: HOSPADM

## 2022-11-07 RX ORDER — ATORVASTATIN CALCIUM 40 MG/1
40 TABLET, FILM COATED ORAL EVERY OTHER DAY
Status: DISCONTINUED | OUTPATIENT
Start: 2022-11-07 | End: 2022-11-08 | Stop reason: HOSPADM

## 2022-11-07 RX ORDER — ROCURONIUM BROMIDE 10 MG/ML
INJECTION, SOLUTION INTRAVENOUS PRN
Status: DISCONTINUED | OUTPATIENT
Start: 2022-11-07 | End: 2022-11-07 | Stop reason: SDUPTHER

## 2022-11-07 RX ORDER — FENTANYL CITRATE 50 UG/ML
INJECTION, SOLUTION INTRAMUSCULAR; INTRAVENOUS PRN
Status: DISCONTINUED | OUTPATIENT
Start: 2022-11-07 | End: 2022-11-07 | Stop reason: SDUPTHER

## 2022-11-07 RX ORDER — ACETAMINOPHEN 325 MG/1
650 TABLET ORAL
Status: DISCONTINUED | OUTPATIENT
Start: 2022-11-07 | End: 2022-11-07 | Stop reason: SDUPTHER

## 2022-11-07 RX ORDER — SODIUM CHLORIDE 0.9 % (FLUSH) 0.9 %
5-40 SYRINGE (ML) INJECTION PRN
Status: DISCONTINUED | OUTPATIENT
Start: 2022-11-07 | End: 2022-11-07 | Stop reason: SDUPTHER

## 2022-11-07 RX ORDER — METOPROLOL TARTRATE 5 MG/5ML
5 INJECTION INTRAVENOUS EVERY 6 HOURS
Status: DISCONTINUED | OUTPATIENT
Start: 2022-11-07 | End: 2022-11-07

## 2022-11-07 RX ORDER — OXYCODONE HYDROCHLORIDE 5 MG/1
2.5 TABLET ORAL EVERY 4 HOURS PRN
Status: DISCONTINUED | OUTPATIENT
Start: 2022-11-07 | End: 2022-11-08 | Stop reason: HOSPADM

## 2022-11-07 RX ORDER — 0.9 % SODIUM CHLORIDE 0.9 %
250 INTRAVENOUS SOLUTION INTRAVENOUS ONCE
Status: COMPLETED | OUTPATIENT
Start: 2022-11-07 | End: 2022-11-07

## 2022-11-07 RX ORDER — ONDANSETRON 2 MG/ML
4 INJECTION INTRAMUSCULAR; INTRAVENOUS EVERY 6 HOURS PRN
Status: DISCONTINUED | OUTPATIENT
Start: 2022-11-07 | End: 2022-11-08 | Stop reason: HOSPADM

## 2022-11-07 RX ORDER — ONDANSETRON 2 MG/ML
4 INJECTION INTRAMUSCULAR; INTRAVENOUS
Status: DISCONTINUED | OUTPATIENT
Start: 2022-11-07 | End: 2022-11-07 | Stop reason: SDUPTHER

## 2022-11-07 RX ORDER — DIPHENHYDRAMINE HYDROCHLORIDE 50 MG/ML
12.5 INJECTION INTRAMUSCULAR; INTRAVENOUS
Status: DISCONTINUED | OUTPATIENT
Start: 2022-11-07 | End: 2022-11-07 | Stop reason: HOSPADM

## 2022-11-07 RX ORDER — LIDOCAINE HYDROCHLORIDE AND EPINEPHRINE 10; 10 MG/ML; UG/ML
INJECTION, SOLUTION INFILTRATION; PERINEURAL PRN
Status: DISCONTINUED | OUTPATIENT
Start: 2022-11-07 | End: 2022-11-07 | Stop reason: ALTCHOICE

## 2022-11-07 RX ORDER — SODIUM CHLORIDE 9 MG/ML
INJECTION, SOLUTION INTRAVENOUS CONTINUOUS PRN
Status: DISCONTINUED | OUTPATIENT
Start: 2022-11-07 | End: 2022-11-07 | Stop reason: SDUPTHER

## 2022-11-07 RX ORDER — LABETALOL HYDROCHLORIDE 5 MG/ML
INJECTION, SOLUTION INTRAVENOUS PRN
Status: DISCONTINUED | OUTPATIENT
Start: 2022-11-07 | End: 2022-11-07 | Stop reason: SDUPTHER

## 2022-11-07 RX ORDER — SODIUM CHLORIDE 9 MG/ML
INJECTION, SOLUTION INTRAVENOUS PRN
Status: DISCONTINUED | OUTPATIENT
Start: 2022-11-07 | End: 2022-11-07 | Stop reason: HOSPADM

## 2022-11-07 RX ORDER — SODIUM CHLORIDE 0.9 % (FLUSH) 0.9 %
10 SYRINGE (ML) INJECTION PRN
Status: DISCONTINUED | OUTPATIENT
Start: 2022-11-07 | End: 2022-11-07 | Stop reason: SDUPTHER

## 2022-11-07 RX ORDER — ONDANSETRON 4 MG/1
4 TABLET, ORALLY DISINTEGRATING ORAL ONCE
Status: COMPLETED | OUTPATIENT
Start: 2022-11-07 | End: 2022-11-07

## 2022-11-07 RX ORDER — SODIUM CHLORIDE 0.9 % (FLUSH) 0.9 %
5-40 SYRINGE (ML) INJECTION PRN
Status: DISCONTINUED | OUTPATIENT
Start: 2022-11-07 | End: 2022-11-07 | Stop reason: HOSPADM

## 2022-11-07 RX ORDER — FENTANYL CITRATE 50 UG/ML
25 INJECTION, SOLUTION INTRAMUSCULAR; INTRAVENOUS ONCE
Status: COMPLETED | OUTPATIENT
Start: 2022-11-07 | End: 2022-11-07

## 2022-11-07 RX ORDER — BUSPIRONE HYDROCHLORIDE 5 MG/1
5 TABLET ORAL 2 TIMES DAILY PRN
Status: DISCONTINUED | OUTPATIENT
Start: 2022-11-07 | End: 2022-11-08 | Stop reason: HOSPADM

## 2022-11-07 RX ORDER — DEXAMETHASONE SODIUM PHOSPHATE 10 MG/ML
INJECTION INTRAMUSCULAR; INTRAVENOUS PRN
Status: DISCONTINUED | OUTPATIENT
Start: 2022-11-07 | End: 2022-11-07 | Stop reason: SDUPTHER

## 2022-11-07 RX ORDER — ALBUTEROL SULFATE 2.5 MG/3ML
2.5 SOLUTION RESPIRATORY (INHALATION) EVERY 6 HOURS PRN
Status: DISCONTINUED | OUTPATIENT
Start: 2022-11-07 | End: 2022-11-08 | Stop reason: HOSPADM

## 2022-11-07 RX ORDER — DROPERIDOL 2.5 MG/ML
0.62 INJECTION, SOLUTION INTRAMUSCULAR; INTRAVENOUS
Status: DISCONTINUED | OUTPATIENT
Start: 2022-11-07 | End: 2022-11-07 | Stop reason: HOSPADM

## 2022-11-07 RX ORDER — SODIUM CHLORIDE 0.9 % (FLUSH) 0.9 %
5-40 SYRINGE (ML) INJECTION EVERY 12 HOURS SCHEDULED
Status: DISCONTINUED | OUTPATIENT
Start: 2022-11-07 | End: 2022-11-07 | Stop reason: SDUPTHER

## 2022-11-07 RX ORDER — SUCCINYLCHOLINE/SOD CL,ISO/PF 200MG/10ML
SYRINGE (ML) INTRAVENOUS PRN
Status: DISCONTINUED | OUTPATIENT
Start: 2022-11-07 | End: 2022-11-07 | Stop reason: SDUPTHER

## 2022-11-07 RX ORDER — ACETAMINOPHEN 325 MG/1
650 TABLET ORAL EVERY 4 HOURS PRN
Status: DISCONTINUED | OUTPATIENT
Start: 2022-11-07 | End: 2022-11-08 | Stop reason: HOSPADM

## 2022-11-07 RX ORDER — IPRATROPIUM BROMIDE AND ALBUTEROL SULFATE 2.5; .5 MG/3ML; MG/3ML
1 SOLUTION RESPIRATORY (INHALATION)
Status: DISCONTINUED | OUTPATIENT
Start: 2022-11-07 | End: 2022-11-07 | Stop reason: HOSPADM

## 2022-11-07 RX ADMIN — LABETALOL HYDROCHLORIDE 5 MG: 5 INJECTION INTRAVENOUS at 14:24

## 2022-11-07 RX ADMIN — HYDROMORPHONE HYDROCHLORIDE 0.5 MG: 1 INJECTION, SOLUTION INTRAMUSCULAR; INTRAVENOUS; SUBCUTANEOUS at 15:35

## 2022-11-07 RX ADMIN — HYDROMORPHONE HYDROCHLORIDE 0.5 MG: 1 INJECTION, SOLUTION INTRAMUSCULAR; INTRAVENOUS; SUBCUTANEOUS at 15:55

## 2022-11-07 RX ADMIN — FENTANYL CITRATE 50 MCG: 50 INJECTION, SOLUTION INTRAMUSCULAR; INTRAVENOUS at 14:00

## 2022-11-07 RX ADMIN — FENTANYL CITRATE 25 MCG: 0.05 INJECTION, SOLUTION INTRAMUSCULAR; INTRAVENOUS at 11:00

## 2022-11-07 RX ADMIN — SODIUM CHLORIDE 250 ML: 9 INJECTION, SOLUTION INTRAVENOUS at 10:57

## 2022-11-07 RX ADMIN — CEFOXITIN SODIUM 2000 MG: 2 POWDER, FOR SOLUTION INTRAVENOUS at 13:11

## 2022-11-07 RX ADMIN — LIDOCAINE HYDROCHLORIDE 80 MG: 20 INJECTION, SOLUTION INTRAVENOUS at 14:00

## 2022-11-07 RX ADMIN — DEXAMETHASONE SODIUM PHOSPHATE 10 MG: 10 INJECTION INTRAMUSCULAR; INTRAVENOUS at 14:08

## 2022-11-07 RX ADMIN — ONDANSETRON 4 MG: 4 TABLET, ORALLY DISINTEGRATING ORAL at 09:40

## 2022-11-07 RX ADMIN — Medication 120 MG: at 14:00

## 2022-11-07 RX ADMIN — FENTANYL CITRATE 50 MCG: 50 INJECTION, SOLUTION INTRAMUSCULAR; INTRAVENOUS at 14:18

## 2022-11-07 RX ADMIN — LABETALOL HYDROCHLORIDE 2.5 MG: 5 INJECTION INTRAVENOUS at 14:58

## 2022-11-07 RX ADMIN — TAMSULOSIN HYDROCHLORIDE 0.4 MG: 0.4 CAPSULE ORAL at 20:50

## 2022-11-07 RX ADMIN — FENTANYL CITRATE 50 MCG: 50 INJECTION, SOLUTION INTRAMUSCULAR; INTRAVENOUS at 14:09

## 2022-11-07 RX ADMIN — METOPROLOL SUCCINATE 25 MG: 50 TABLET, EXTENDED RELEASE ORAL at 18:49

## 2022-11-07 RX ADMIN — SODIUM CHLORIDE: 9 INJECTION, SOLUTION INTRAVENOUS at 13:48

## 2022-11-07 RX ADMIN — ATORVASTATIN CALCIUM 40 MG: 40 TABLET, FILM COATED ORAL at 20:50

## 2022-11-07 RX ADMIN — LABETALOL HYDROCHLORIDE 2.5 MG: 5 INJECTION INTRAVENOUS at 15:09

## 2022-11-07 RX ADMIN — ROCURONIUM BROMIDE 30 MG: 10 INJECTION, SOLUTION INTRAVENOUS at 14:10

## 2022-11-07 RX ADMIN — SUGAMMADEX 250 MG: 100 INJECTION, SOLUTION INTRAVENOUS at 14:55

## 2022-11-07 RX ADMIN — ETOMIDATE INJECTION 16 MG: 2 SOLUTION INTRAVENOUS at 14:00

## 2022-11-07 RX ADMIN — SODIUM CHLORIDE, POTASSIUM CHLORIDE, SODIUM LACTATE AND CALCIUM CHLORIDE: 600; 310; 30; 20 INJECTION, SOLUTION INTRAVENOUS at 13:11

## 2022-11-07 RX ADMIN — ONDANSETRON HYDROCHLORIDE 4 MG: 2 SOLUTION INTRAMUSCULAR; INTRAVENOUS at 14:48

## 2022-11-07 RX ADMIN — ROCURONIUM BROMIDE 10 MG: 10 INJECTION, SOLUTION INTRAVENOUS at 14:32

## 2022-11-07 ASSESSMENT — PAIN DESCRIPTION - DESCRIPTORS
DESCRIPTORS: DULL;ACHING;DISCOMFORT
DESCRIPTORS: DULL;ACHING;DISCOMFORT
DESCRIPTORS: ACHING;DISCOMFORT;SHARP

## 2022-11-07 ASSESSMENT — PAIN DESCRIPTION - LOCATION
LOCATION: ABDOMEN

## 2022-11-07 ASSESSMENT — PAIN SCALES - GENERAL
PAINLEVEL_OUTOF10: 8
PAINLEVEL_OUTOF10: 0
PAINLEVEL_OUTOF10: 7
PAINLEVEL_OUTOF10: 0
PAINLEVEL_OUTOF10: 8
PAINLEVEL_OUTOF10: 5
PAINLEVEL_OUTOF10: 7
PAINLEVEL_OUTOF10: 0
PAINLEVEL_OUTOF10: 7

## 2022-11-07 ASSESSMENT — ENCOUNTER SYMPTOMS
ABDOMINAL PAIN: 1
WHEEZING: 0
BLOOD IN STOOL: 0
CONSTIPATION: 0
DIARRHEA: 0
NAUSEA: 1
ABDOMINAL DISTENTION: 0
SHORTNESS OF BREATH: 0
COUGH: 0
VOMITING: 1

## 2022-11-07 ASSESSMENT — LIFESTYLE VARIABLES: SMOKING_STATUS: 0

## 2022-11-07 ASSESSMENT — PAIN DESCRIPTION - ORIENTATION
ORIENTATION: LOWER
ORIENTATION: LEFT;LOWER

## 2022-11-07 ASSESSMENT — PAIN - FUNCTIONAL ASSESSMENT
PAIN_FUNCTIONAL_ASSESSMENT: ACTIVITIES ARE NOT PREVENTED
PAIN_FUNCTIONAL_ASSESSMENT: 0-10

## 2022-11-07 NOTE — OP NOTE
Operative Note      Patient: Satish Ramos  YOB: 1930  MRN: [id number]    Date of Procedure: 11/7/2022    Pre-Op Diagnosis: INCARCERATED RIGHT INGUINAL HERNIA CAUSING OBSTRUCTION    Post-Op Diagnosis: SAME, viable small bowel       Procedure(s):  LAPAROSCOPIC RIGHT INGUINAL HERNIA REPAIR WITH MESH PLACEMENT    Surgeon(s):  Marleni William MD    Assistant:   Resident: Kalani Lubin MD; Vita Delacruz DO    Anesthesia: General    Estimated Blood Loss (mL): 5cc    Complications: None    Specimens:   * No specimens in log *    Implants:  Implant Name Type Inv. Item Serial No.  Lot No. LRB No. Used Action   MESH MOMO L W4.1XL6.2IN R POLYPR L PORE KNIT LT 3DMAX - [id number]  MESH MOMO L W4.1XL6.2IN R POLYPR L PORE KNIT LT 3DMAX  BARD DAVOL-WD [id number] Right 1 Implanted         Drains:   [REMOVED] Urinary Catheter 11/07/22 2 Way; Nixon (Removed)       Findings: Incarcerated direct right inguinal hernia causing small bowel obstruction, small bowel viable    Detailed Description of Procedure: The patient was brought to the operating room and positioned supine on the OR table. Sequential compression devices were placed on the patient's lower extremities and functioning. Preoperative antibiotics were administered. Anesthesia was obtained without complication as per the anesthesia record. Immediately prior to the procedure a time-out was called and the surgical checklist was reviewed and agreed upon by all present. A nixon catheter was inserted. The hernia was then able to be reduced after the patient was adequately sedated and paralyzed. The patient was prepped and draped in the usual sterile fashion. Local anesthesia was injected. A stab incision was made at Forrester's point, the veress needle was inserted. Position confirmed with saline drop test. The abdomen was then insufflated to 15mmHg. A 5mm vertical midline incision was made at the umbilicus.  A 5mm trocar was inserted, followed by the laparoscope. There was no injury noted while obtaining access. The camera was inserted and no injury was seen. A 5mm trocar was placed in the left lower quadrant x2 under direct visualization. The abdomen was inspected. The small bowel was inspected and incarcerated segment of bowel appeared viable without any injury. There was a small ecchymotic area of the mesentery which was the incarcerated segment causing the obstruction. The inguinal area was inspected and there was a defect medial to the epigastric vessels on the RIGHT side, consistent with direct inguinal hernia. The peritoneum was then incised at the level ASIS from lateral to medial using electrocautery and scissors. Our preperitoneal working space was then created with blunt dissection. This was carried down to the level of the pubic tubercle medially and to the ASIS and psoas laterally. Delroy's ligament was identified and cleared off medially. The hernia sac and cord structures were identified. There was a cord lipoma present which was bluntly dissected from the cord structures and reduced into the peritoneal cavity. The hernia sac was dissected free from the cord structures bluntly. Once the peritoneal flap and hernia sac were dissected off the cord structures without any tenting, we were ready to place our mesh. A large 3D Max Light mesh was was then inserted and flattened, covering the entire myopectineal orifice. It was tacked at the pubic tubercle using laparoscopic tacker. The mesh was positioned and there was good coverage of the entire area with peritoneum dissected away from the inferior edge of the mesh. The peritoneal flap was then tacked covering the mesh in its entirety. A right inguinal block was performed using local anesthetic. Pneumoperitoneum was released and the ports were removed. The skin incisions were closed with 4-0 Monocryl deep dermal sutures and skin glue applied.      Needle, sponge, and instruments counts were correct x2. The nixon catheter was removed. Two testicles were palpated in the scrotum. Patient tolerated procedure well without any complications and was transferred to the recovery area in good condition. Dr. Lesley Garner was present and scrubbed for the procedure.     Bora Page DO  Surgery Resident PGY-5  11/7/2022  3:22 PM

## 2022-11-07 NOTE — PROGRESS NOTES
Dr. Grace Mann MD,    Your patient is on a medication that requires a renal dose adjustment. Renal Function Assessment:    Date Body Weight IBW  Adjusted BW SCr  CrCl Dialysis status   11/7/2022 163 lb (73.9 kg)  Ideal body weight: 75.3 kg (166 lb 0.1 oz) Serum creatinine: 1.7 mg/dL (H) 11/07/22 0950  Estimated creatinine clearance: 29 mL/min (A) N/a       Pharmacy has renally dose-adjusted the following medication(s):    Date Original Order Renally Adjusted Order   11/7/2022 Cefoxitin 2 grams q8h Cefoxitin 2 grams q12h       These changes were made per protocol according to the [facility] Renal Function-Based Dose Adjustments Policy    *Please note this dose may need readjusted if your patient's renal function significantly improves. Please contact pharmacy with any questions regarding these changes.     Criss Tristan [facility]  11/7/2022  12:37 PM

## 2022-11-07 NOTE — ED NOTES
Pt has been readied for surgery. Antibiotic has been hung, type and screen harvested and sent to lab, pt placed in gown, surgical consent form has been signed and anesthetic consent has been placed in pt soft chart awaiting explanation and signature acquisition.  NG tube has been held at Ilda Andrade RN  11/07/22 0251

## 2022-11-07 NOTE — ED NOTES
Pt to surgery at this time, cab off sent to lab by [facility], RN  11/07/22 1456

## 2022-11-07 NOTE — ED NOTES
RN spoke with surgeon, he instructs this RN to hang antibiotic, ringers and have consent signed as he has spoken with the patient about the operation while RN was busy with a critical pt. He also instructs this RN to hold off on NG insertion for the time being as they will insert it prior to beginning the procedure. Estimated start time is roughly 1400 today.       Jermaine Garay RN  11/07/22 5705 Jesus Dean RN  11/07/22 1326

## 2022-11-07 NOTE — H&P
GENERAL SURGERY  HISTORY AND PHYSICAL  11/7/2022    Chief Complaint   Patient presents with    Abdominal Pain     Lower abdomen and groin pain. Nausea and vomiting for the last 3 days. Hx of hernia. SINTIA Moise is a 80 y.o. male who presents for evaluation of increased pain at right groin hernia starting four days ago. He has had this hernia for years but recently gotten larger and now won't reduce, states it got larger after he was exercising. Has been having increased pain, nausea, vomiting, and PO intolerance. Last BM was yesterday but not passing flatus or BM since then. Has been seen in the office and decision made to defer repair given significant cardiac history    Hemodynamically stable, afebrile, labs grossly unremarkable  CT scan concerning for R hernia causing bowel obstruction        Past Medical History:   Diagnosis Date    Asthma     CAD (coronary artery disease)     COPD (chronic obstructive pulmonary disease) ([facility] [address]     HIGH CHOLESTEROL     History of DVT (deep vein thrombosis)     Hx of blood clots     Hypertension     Thyroid disease     TIA (transient ischemic attack) 1998       Past Surgical History:   Procedure Laterality Date    APPENDECTOMY      BACK SURGERY      lumbar    CARDIAC CATHETERIZATION  11/16/2017    Dr Curry Adrian TEST      2/26/2013    CAROTID ENDARTERECTOMY  [id number]    CATARACT REMOVAL      CHOLECYSTECTOMY      CORONARY ARTERY BYPASS GRAFT  3/2004    LIMA-LAD, SVG-D1, SVG-OM1, SVG-posterolateral branch of CX    CYST REMOVAL      DIAGNOSTIC CARDIAC CATH LAB PROCEDURE  2/2004    EYE SURGERY      GALLBLADDER SURGERY      HAND SURGERY      HIP SURGERY      right    JOINT REPLACEMENT      OTHER SURGICAL HISTORY  12/13/2017    Dr. Sarah Muller and Dr. Margaret Mattson- TAVR Brandi 26mm valve    TONSILLECTOMY      TRANSTHORACIC ECHOCARDIOGRAM  2007       Prior to Admission medications    Medication Sig Start Date End Date Taking?  Authorizing Provider lisinopril (PRINIVIL;ZESTRIL) 5 MG tablet TAKE ONE TABLET BY MOUTH DAILY 22   Carlie Mendez MD   furosemide (LASIX) 40 MG tablet Take 1 tablet by mouth every other day 6/3/22   Carlie Mendez MD   metoprolol succinate (TOPROL XL) 25 MG extended release tablet Take 2 tablets by mouth in the morning and 1 tablet in the afternoon 21   Carlie Mendez MD   Polyvinyl Alcohol-Povidone (REFRESH OP) Apply to eye    Historical Provider, MD   potassium chloride (MICRO-K) 10 MEQ extended release capsule Take 10 mEq by mouth daily 20   Historical Provider, MD   busPIRone (BUSPAR) 5 MG tablet TAKE ONE TABLET BY MOUTH EVERY 12 HOURS AS NEEDED FOR ANXIOUSNESS 20   Historical Provider, MD   Budesonide-Formoterol Fumarate (SYMBICORT IN) Inhale into the lungs    Historical Provider, MD   nitroGLYCERIN (NITROSTAT) 0.4 MG SL tablet Place 1 tablet under the tongue every 5 minutes as needed for Chest pain 10/11/16   Peace Wilson MD   tamsulosin [facility]) 0.4 MG capsule Take 0.4 mg by mouth every evening  4/13/15   Historical Provider, MD   ipratropium-albuterol (Romi Peeks) 0.5-2.5 (3) MG/3ML SOLN nebulizer solution  13   Historical Provider, MD   atorvastatin (LIPITOR) 40 MG tablet Take 40 mg by mouth every other day     Historical Provider, MD   aspirin 81 MG EC tablet Take 81 mg by mouth daily. Historical Provider, MD   ALBUTEROL IN Inhale 2.5 mg into the lungs as needed. Historical Provider, MD   levothyroxine (SYNTHROID) 125 MCG tablet Take 125 mcg by mouth daily.       Historical Provider, MD       No Known Allergies    Family History   Problem Relation Age of Onset    Heart Disease Mother     Heart Disease Father        Social History     Tobacco Use    Smoking status: Former     Packs/day: 0.25     Years: 30.00     Pack years: 7.50     Types: Cigarettes     Quit date: 2004     Years since quittin.8    Smokeless tobacco: Never   Vaping Use    Vaping Use: Never used   Substance Use Topics    Alcohol use: Yes     Alcohol/week: 4.0 standard drinks     Types: 2 Glasses of wine, 2 Cans of beer per week    Drug use: No         Review of Systems: pertinent ROS listed in HPI, all others negative       PHYSICAL EXAM:    Vitals:    11/07/22 1102   BP: 139/72   Pulse: 71   Resp: 20   Temp:    SpO2: 99%       GENERAL:  NAD. A&Ox3. Non-toxic. HEAD:  Normocephalic, atraumatic. EYES:   No scleral icterus. PERRLA. LUNGS:  No increased work of breathing. CARDIOVASCULAR: RR  ABDOMEN:  Soft, non-distended,mild diffuse tenderness. No guarding, rigidity, rebound. Incarcerated right inguinal hernia, firm, tender, no overlying skin changes. SKIN: Warm and dry. ASSESSMENT/PLAN:  80 y.o. male with incarcerated right inguinal hernia causing SBO, concern for possible compromised small bowel    CT Scan reviewed -- R inguinal hernia containing small bowel causing obstruction proximally and mesenteric edema/adjacent fluid  NPO, IVF  Insert NG tube  Cefoxitin for periop Abx    Discussion had with patient and son at bedside regarding code status -- DNRCCA  STAT OR for lap, possible open, inguinal hernia repair with possible small bowel obstruction    The risks, benefits, alternatives, and potential complications of the procedure, including the risks of bleeding, infection, injury to surrounding structures, need for additional procedures, and death were explained to the patient. All questions were answered. The patient understands and agrees to proceed with the procedure.      Silvano Baez DO  Surgery Resident PGY-5  11/7/2022  12:35 PM

## 2022-11-07 NOTE — ED PROVIDER NOTES
Lopez Olea is a 80 y.o. male    Chief Complaint   Patient presents with    Abdominal Pain     Lower abdomen and groin pain. Nausea and vomiting for the last 3 days. Hx of hernia. HPI   Lopez Olea is a 80 y.o. male presenting to the ED for Abdominal Pain (Lower abdomen and groin pain. Nausea and vomiting for the last 3 days. Hx of hernia.)    History comes primarily from the patient and Family. Patient has a personal history of right inguinal hernia managed conservatively. Patient follows with Dr. Shashi Rodriguez as outpatient. Patient was told to go to the emergency department if he had symptomatic hernia for further evaluation. Patient states pain has been present for 1 year but getting worse since 11/05/22. Bulge is getting more painful and bigger per patient. Associated with nausea and non bloody nonbilious vomiting. Denies fever, chest pain, shortness of breath, GI bleeding and urinary symptoms. Patient states he is eating and drinking less secondary to nausea and vomiting. Last bowel movement was yesterday. Review of Systems   Constitutional:  Positive for appetite change (Due to nausea and vomiting). Negative for chills and fever. HENT:  Negative for congestion and sneezing. Eyes:  Negative for visual disturbance. Respiratory:  Negative for cough, shortness of breath and wheezing. Cardiovascular:  Negative for chest pain, palpitations and leg swelling. Gastrointestinal:  Positive for abdominal pain (Lower abdomen), nausea and vomiting. Negative for abdominal distention, blood in stool, constipation and diarrhea. Genitourinary:  Negative for dysuria, frequency, penile pain, scrotal swelling and urgency. Skin:  Negative for rash. Neurological:  Negative for dizziness and headaches. Psychiatric/Behavioral:  Negative for confusion. Physical Exam  Vitals reviewed. Constitutional:       General: He is not in acute distress.   HENT:      Head: Normocephalic and atraumatic. Cardiovascular:      Rate and Rhythm: Normal rate and regular rhythm. Pulmonary:      Effort: Pulmonary effort is normal. No respiratory distress. Breath sounds: Normal breath sounds. No wheezing. Abdominal:      General: Bowel sounds are normal. There is no distension or abdominal bruit. Palpations: Abdomen is soft. Tenderness: There is generalized abdominal tenderness (Worse in the lower abdomen). There is no guarding or rebound. Hernia: A hernia (Right inguinal, nonreducible) is present. Genitourinary:     Penis: Normal.       Testes: Normal.   Skin:     General: Skin is warm and dry. Capillary Refill: Capillary refill takes less than 2 seconds. Neurological:      General: No focal deficit present. Mental Status: He is alert and oriented to person, place, and time. Psychiatric:         Mood and Affect: Mood normal.         Behavior: Behavior normal.        MDM  Patient presented to the Emergency Department for Abdominal Pain (Lower abdomen and groin pain. Nausea and vomiting for the last 3 days. Hx of hernia.)    Patient is a 80-year-old male who comes to the emergency department for symptomatic right inguinal hernia. Zofran given for nausea and vomiting with mild improvement. Fentanyl given for pain with mild improvement. Lipase is within normal limits. CMP shows a creatinine of 1.7 which is in his baseline and a GFR of 37. IV fluids 250 mL of normal saline ordered due to dehydration and to prevent contrast-induced nephropathy. CT abdomen/pelvis showing small-bowel obstruction with transition point in right inguinal hernia, free fluid visualized underlying the obstructed small bowel worrisome for incarceration. Patient is hemodynamically stable and afebrile. CT abdomen/pelvis concerning for right inguinal hernia causing bowel obstruction. General surgery consulted, patient will be admitted for possible open inguinal hernia repair.     ED Course as of 11/07/22 1307   Mon Nov 07, 2022   1145 CT ABDOMEN PELVIS WO CONTRAST Additional Contrast? None [AL]      ED Course User Index  [AL] Lyndsey Mattson MD       --------------------------------------------- PAST HISTORY ---------------------------------------------  Past Medical History:  has a past medical history of Asthma, CAD (coronary artery disease), COPD (chronic obstructive pulmonary disease) ([facility] [address] HIGH CHOLESTEROL, History of DVT (deep vein thrombosis), Hx of blood clots, Hypertension, Thyroid disease, and TIA (transient ischemic attack). Past Surgical History:  has a past surgical history that includes Tonsillectomy; Appendectomy; Gallbladder surgery; hip surgery; back surgery; Hand surgery; Cholecystectomy; joint replacement; Diagnostic Cardiac Cath Lab Procedure (2/2004); Coronary artery bypass graft (3/2004); Carotid endarterectomy (0603-8490); cardiovascular stress test; transthoracic echocardiogram (2007); Cataract removal; Cardiac catheterization (11/16/2017); eye surgery; cyst removal; and other surgical history (12/13/2017). Social History:  reports that he quit smoking about 18 years ago. His smoking use included cigarettes. He has a 7.50 pack-year smoking history. He has never used smokeless tobacco. He reports current alcohol use of about 4.0 standard drinks per week. He reports that he does not use drugs. Family History: family history includes Heart Disease in his father and mother. The patients home medications have been reviewed. Allergies: Patient has no known allergies.     -------------------------------------------------- RESULTS -------------------------------------------------    LABS:  Results for orders placed or performed during the hospital encounter of 11/07/22   CBC with Auto Differential   Result Value Ref Range    WBC 9.7 4.5 - 11.5 E9/L    RBC 4.78 3.80 - 5.80 E12/L    Hemoglobin 14.2 12.5 - 16.5 g/dL    Hematocrit 42.7 37.0 - 54.0 %    MCV 89.3 80.0 - 99.9 fL    MCH 29.7 26.0 - 35.0 pg    MCHC 33.3 32.0 - 34.5 %    RDW 13.2 11.5 - 15.0 fL    Platelets 203 865 - 125 E9/L    MPV 8.9 7.0 - 12.0 fL    Neutrophils % 81.8 (H) 43.0 - 80.0 %    Immature Granulocytes % 0.5 0.0 - 5.0 %    Lymphocytes % 9.8 (L) 20.0 - 42.0 %    Monocytes % 7.4 2.0 - 12.0 %    Eosinophils % 0.3 0.0 - 6.0 %    Basophils % 0.2 0.0 - 2.0 %    Neutrophils Absolute 7.92 (H) 1.80 - 7.30 E9/L    Immature Granulocytes # 0.05 E9/L    Lymphocytes Absolute 0.95 (L) 1.50 - 4.00 E9/L    Monocytes Absolute 0.72 0.10 - 0.95 E9/L    Eosinophils Absolute 0.03 (L) 0.05 - 0.50 E9/L    Basophils Absolute 0.02 0.00 - 0.20 E9/L   Comprehensive Metabolic Panel   Result Value Ref Range    Sodium 134 132 - 146 mmol/L    Potassium 5.0 3.5 - 5.0 mmol/L    Chloride 100 98 - 107 mmol/L    CO2 21 (L) 22 - 29 mmol/L    Anion Gap 13 7 - 16 mmol/L    Glucose 121 (H) 74 - 99 mg/dL    BUN 42 (H) 6 - 23 mg/dL    Creatinine 1.7 (H) 0.7 - 1.2 mg/dL    Est, Glom Filt Rate 37 >=60 mL/min/1.73    Calcium 9.4 8.6 - 10.2 mg/dL    Total Protein 6.6 6.4 - 8.3 g/dL    Albumin 3.9 3.5 - 5.2 g/dL    Total Bilirubin 0.8 0.0 - 1.2 mg/dL    Alkaline Phosphatase 87 40 - 129 U/L    ALT 16 0 - 40 U/L    AST 23 0 - 39 U/L   Lipase   Result Value Ref Range    Lipase 16 13 - 60 U/L   SPECIMEN REJECTION   Result Value Ref Range    Rejected Test lacid     Reason for Rejection see below        RADIOLOGY:  CT ABDOMEN PELVIS WO CONTRAST Additional Contrast? None   Final Result   Small-bowel obstruction with transition point in right inguinal hernia, free   fluid visualized underlying the obstructed small bowel worrisome for   incarceration would recommend surgical consultation.       RECOMMENDATIONS:   Recommend surgical consultation.             ------------------------- NURSING NOTES AND VITALS REVIEWED ---------------------------  Date / Time Roomed:  11/7/2022  7:52 AM  ED Bed Assignment:  22/18    The nursing notes within the ED encounter and vital signs as below have been reviewed. Patient Vitals for the past 24 hrs:   BP Temp Pulse Resp SpO2 Height Weight   11/07/22 1102 139/72 -- 71 20 99 % -- --   11/07/22 1100 -- -- -- 19 -- -- --   11/07/22 0748 115/69 98 °F (36.7 °C) 73 16 97 % 5' 11\" (1.803 m) 163 lb (73.9 kg)   11/07/22 0742 -- -- 73 -- 97 % -- --       ------------------------------------------ PROGRESS NOTES ------------------------------------------  Re-evaluation(s):  Time: 12:30  Patients symptoms show no change  Repeat physical examination is not changed    Counseling:  I have spoken with the patient and discussed todays results, in addition to providing specific details for the plan of care and counseling regarding the diagnosis and prognosis. Their questions are answered at this time and they are agreeable with the plan of admission.    --------------------------------- ADDITIONAL PROVIDER NOTES ---------------------------------  Consultations:  Time: 12:30. Spoke with Dr. Reyna Vincent. Discussed case. They will admit the patient. This patient's ED course included: a personal history and physicial examination, re-evaluation prior to disposition, multiple bedside re-evaluations, IV medications, and complex medical decision making and emergency management    This patient has remained hemodynamically stable during their ED course. Diagnosis:  1. Inguinal hernia, right        Disposition:  Patient's disposition: Admit to med/surg floor  Patient's condition is stable.      MD Yancy Schmitt MD  Resident  11/07/22 0674

## 2022-11-07 NOTE — ED NOTES
Dr Josh Oliva notified for Pts GFR 37 and saji will be needed for CT scan     Juaquin Pritchett RN  11/07/22 3549

## 2022-11-07 NOTE — ANESTHESIA PRE PROCEDURE
Department of Anesthesiology  Preprocedure Note       Name:  Onofre Kahn   Age:  80 y.o.  :  1930                                          MRN:  [id number]         Date:  2022      Surgeon: Suly De Guzman):  Yao Giles MD    Procedure: Procedure(s):  LAPAROSCOPIC POSS. OPEN RIGHT INGUINAL HERNIA REPAIR POSS BOWEL RESECTION    Medications prior to admission:   Prior to Admission medications    Medication Sig Start Date End Date Taking? Authorizing Provider   lisinopril (PRINIVIL;ZESTRIL) 5 MG tablet TAKE ONE TABLET BY MOUTH DAILY 22   Ruby Mendez MD   furosemide (LASIX) 40 MG tablet Take 1 tablet by mouth every other day 6/3/22   Ruby Mendez MD   metoprolol succinate (TOPROL XL) 25 MG extended release tablet Take 2 tablets by mouth in the morning and 1 tablet in the afternoon 21   Ruby Mendez MD   Polyvinyl Alcohol-Povidone (REFRESH OP) Apply to eye    Historical Provider, MD   potassium chloride (MICRO-K) 10 MEQ extended release capsule Take 10 mEq by mouth daily 20   Historical Provider, MD   busPIRone (BUSPAR) 5 MG tablet TAKE ONE TABLET BY MOUTH EVERY 12 HOURS AS NEEDED FOR ANXIOUSNESS 20   Historical Provider, MD   Budesonide-Formoterol Fumarate (SYMBICORT IN) Inhale into the lungs    Historical Provider, MD   nitroGLYCERIN (NITROSTAT) 0.4 MG SL tablet Place 1 tablet under the tongue every 5 minutes as needed for Chest pain 10/11/16   Elayne Neal MD   tamsulosin [facility]) 0.4 MG capsule Take 0.4 mg by mouth every evening  4/13/15   Historical Provider, MD   ipratropium-albuterol (Carlie Finder) 0.5-2.5 (3) MG/3ML SOLN nebulizer solution  13   Historical Provider, MD   atorvastatin (LIPITOR) 40 MG tablet Take 40 mg by mouth every other day     Historical Provider, MD   aspirin 81 MG EC tablet Take 81 mg by mouth daily. Historical Provider, MD   ALBUTEROL IN Inhale 2.5 mg into the lungs as needed.     Historical Provider, MD   levothyroxine (SYNTHROID) 125 MCG tablet Take 125 mcg by mouth daily. Historical Provider, MD       Current medications:    Current Facility-Administered Medications   Medication Dose Route Frequency Provider Last Rate Last Admin    iopamidol (ISOVUE-300) 61 % injection 50 mL  50 mL Other ONCE PRN Kalyani Colón MD        lactated ringers infusion   IntraVENous Continuous Jolene Space,  mL/hr at 11/07/22 1311 New Bag at 11/07/22 1311    HYDROmorphone (DILAUDID) injection 0.25 mg  0.25 mg IntraVENous Q2H PRN Jolene Space, DO        cefOXitin (MEFOXIN) 2,000 mg in sodium chloride 0.9 % 50 mL IVPB ([id number])  2,000 mg IntraVENous Q12H [place] B Capal,  mL/hr at 11/07/22 1311 2,000 mg at 11/07/22 1311     Current Outpatient Medications   Medication Sig Dispense Refill    lisinopril (PRINIVIL;ZESTRIL) 5 MG tablet TAKE ONE TABLET BY MOUTH DAILY 90 tablet 3    furosemide (LASIX) 40 MG tablet Take 1 tablet by mouth every other day 30 tablet 5    metoprolol succinate (TOPROL XL) 25 MG extended release tablet Take 2 tablets by mouth in the morning and 1 tablet in the afternoon 90 tablet 5    Polyvinyl Alcohol-Povidone (REFRESH OP) Apply to eye      potassium chloride (MICRO-K) 10 MEQ extended release capsule Take 10 mEq by mouth daily      busPIRone (BUSPAR) 5 MG tablet TAKE ONE TABLET BY MOUTH EVERY 12 HOURS AS NEEDED FOR ANXIOUSNESS      Budesonide-Formoterol Fumarate (SYMBICORT IN) Inhale into the lungs      nitroGLYCERIN (NITROSTAT) 0.4 MG SL tablet Place 1 tablet under the tongue every 5 minutes as needed for Chest pain 25 tablet 5    tamsulosin (FLOMAX) 0.4 MG capsule Take 0.4 mg by mouth every evening   0    ipratropium-albuterol (DUONEB) 0.5-2.5 (3) MG/3ML SOLN nebulizer solution  (Patient not taking: Reported on 11/1/2021)      atorvastatin (LIPITOR) 40 MG tablet Take 40 mg by mouth every other day       aspirin 81 MG EC tablet Take 81 mg by mouth daily.       ALBUTEROL IN Inhale 2.5 mg into the lungs as needed.  levothyroxine (SYNTHROID) 125 MCG tablet Take 125 mcg by mouth daily.            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    Incarcerated inguinal hernia, unilateral K40.30    Incarcerated inguinal hernia K40.30       Past Medical History:        Diagnosis Date    Asthma     CAD (coronary artery disease)     COPD (chronic obstructive pulmonary disease) (HCC)     HIGH CHOLESTEROL     History of DVT (deep vein thrombosis)     Hx of blood clots     Hypertension     Thyroid disease     TIA (transient ischemic attack) 1998       Past Surgical History:        Procedure Laterality Date    APPENDECTOMY      BACK SURGERY      lumbar    CARDIAC CATHETERIZATION  11/16/2017    Dr Geovanna Diaz TEST      2/26/2013    CAROTID ENDARTERECTOMY  [id number]    CATARACT REMOVAL      CHOLECYSTECTOMY      CORONARY ARTERY BYPASS GRAFT  3/2004    LIMA-LAD, SVG-D1, SVG-OM1, SVG-posterolateral branch of CX    CYST REMOVAL      DIAGNOSTIC CARDIAC CATH LAB PROCEDURE  2/2004    EYE SURGERY      GALLBLADDER SURGERY      HAND SURGERY      HIP SURGERY      right    JOINT REPLACEMENT      OTHER SURGICAL HISTORY  12/13/2017    Dr. Vianey Denny and Dr. Roxanne Cruz- TAVR Brandi 26mm valve    TONSILLECTOMY      TRANSTHORACIC ECHOCARDIOGRAM  2007       Social History:    Social History     Tobacco Use    Smoking status: Former     Packs/day: 0.25     Years: 30.00     Pack years: 7.50     Types: Cigarettes     Quit date: 1/1/2004     Years since quitting: 18.8    Smokeless tobacco: Never   Substance Use Topics    Alcohol use: Yes     Alcohol/week: 4.0 standard drinks     Types: 2 Glasses of wine, 2 Cans of beer per week                                Counseling given: Not Answered      Vital Signs (Current):   Vitals:    11/07/22 0748 11/07/22 1100 11/07/22 1102 11/07/22 1325   BP: 115/69  139/72 134/71   Pulse: 73  71 80   Resp: 16 19 20 18   Temp: 36.7 °C (98 °F)      SpO2: 97%  99% 96%   Weight: 163 lb (73.9 kg)      Height: 5' 11\" (1.803 m)                                                 BP Readings from Last 3 Encounters:   11/07/22 134/71   11/01/21 110/62   05/11/21 118/62       NPO Status: Time of last liquid consumption: 0200                        Time of last solid consumption: 0700                        Date of last liquid consumption: 11/07/22                        Date of last solid food consumption: 11/04/22    BMI:   Wt Readings from Last 3 Encounters:   11/07/22 163 lb (73.9 kg)   11/01/21 163 lb 6.4 oz (74.1 kg)   05/11/21 175 lb 11.2 oz (79.7 kg)     Body mass index is 22.73 kg/m².     CBC:   Lab Results   Component Value Date/Time    WBC 9.7 11/07/2022 09:50 AM    RBC 4.78 11/07/2022 09:50 AM    HGB 14.2 11/07/2022 09:50 AM    HCT 42.7 11/07/2022 09:50 AM    MCV 89.3 11/07/2022 09:50 AM    RDW 13.2 11/07/2022 09:50 AM     11/07/2022 09:50 AM       CMP:   Lab Results   Component Value Date/Time     11/07/2022 09:50 AM    K 5.0 11/07/2022 09:50 AM     11/07/2022 09:50 AM    CO2 21 11/07/2022 09:50 AM    BUN 42 11/07/2022 09:50 AM    CREATININE 1.7 11/07/2022 09:50 AM    GFRAA >60 05/11/2021 11:45 AM    LABGLOM 37 11/07/2022 09:50 AM    GLUCOSE 121 11/07/2022 09:50 AM    GLUCOSE 164 10/16/2011 07:40 PM    PROT 6.6 11/07/2022 09:50 AM    CALCIUM 9.4 11/07/2022 09:50 AM    BILITOT 0.8 11/07/2022 09:50 AM    ALKPHOS 87 11/07/2022 09:50 AM    AST 23 11/07/2022 09:50 AM    ALT 16 11/07/2022 09:50 AM       POC Tests: No results for input(s): POCGLU, POCNA, POCK, POCCL, POCBUN, POCHEMO, POCHCT in the last 72 hours. Coags:   Lab Results   Component Value Date/Time    PROTIME 11.3 12/12/2017 12:00 PM    PROTIME 10.9 10/16/2011 07:40 PM    INR 1.0 12/12/2017 12:00 PM    APTT 29.8 12/12/2017 12:00 PM       HCG (If Applicable): No results found for: PREGTESTUR, PREGSERUM, HCG, HCGQUANT     ABGs: No results found for: PHART, PO2ART, LAQ2XOX, EYT2IQR, BEART, T7SHNEQQ     Type & Screen (If Applicable):  No results found for: LABABO, LABRH    Drug/Infectious Status (If Applicable):  No results found for: HIV, HEPCAB    COVID-19 Screening (If Applicable): No results found for: COVID19      CT abdomen 11/7/2022  Impression   Small-bowel obstruction with transition point in right inguinal hernia, free   fluid visualized underlying the obstructed small bowel worrisome for   incarceration would recommend surgical consultation.       RECOMMENDATIONS:   Recommend surgical consultation. EKG 11/1/2021  Sinus  Rhythm  -First degree A-V block   Anel = 264  -Old anteroseptal infarct. ABNORMAL     ECHO 8/6/2020   Summary   Global hypokinesis severe   Ejection fraction contrast biplane =30%. Stage III diastolic dysfunction. The left atrium is severely dilated. L atrial pressure elevated   Moderate to severe mitral regurgitation central jet. s/p TAVR asuncion   SUSAN= 1.27 cm2    Mild aortic regurgitation. Pulmonary hypertension is moderate . No evidence of pericardial effusion. Anesthesia Evaluation  Patient summary reviewed and Nursing notes reviewed   history of anesthetic complications: PONV. Airway: Mallampati: III  TM distance: >3 FB   Neck ROM: limited  Mouth opening: > = 3 FB   Dental:    (+) upper dentures  Comment: Denies loose teeth. Pulmonary:   (+) COPD:  decreased breath sounds: bilateral asthma:     (-) not a current smoker          Patient did not smoke on day of surgery.                 ROS comment: Smoking Status: Former - 7.5 pack years  Quit Smokin04       Cardiovascular:  Exercise tolerance: poor (<4 METS),   (+) hypertension:, valvular problems/murmurs: AS and MR, CAD:, CABG/stent (CABG x 4):, hyperlipidemia      ECG reviewed  Rhythm: regular  Rate: normal  Echocardiogram reviewed         Beta Blocker:  Dose within 24 Hrs         Neuro/Psych:   (+) CVA (RLE weakness): residual symptoms, TIA,             GI/Hepatic/Renal:   (+) renal disease (cr 1.7): CRI,          ROS comment:  Inguinal hernia with obstruction without gangrene    BPH. Endo/Other:    (+) hypothyroidism::., .                 Abdominal:             Vascular:   + DVT, . Other Findings:           Anesthesia Plan      general     ASA 4 - emergent       Induction: intravenous and rapid sequence. arterial line and BIS  MIPS: Postoperative opioids intended and Prophylactic antiemetics administered. Anesthetic plan and risks discussed with patient. Use of blood products discussed with patient whom consented to blood products. Plan discussed with CRNA and attending.                     Dheeraj Richards RN   2022

## 2022-11-07 NOTE — ED PROVIDER NOTES
ATTENDING PROVIDER ATTESTATION:     Chantel Tripp presented to the emergency department for evaluation of Abdominal Pain (Lower abdomen and groin pain. Nausea and vomiting for the last 3 days. Hx of hernia.)   and was initially evaluated by the Medical Resident. See Original ED Note for H&P and ED course above. I have reviewed and discussed the case, including pertinent history (medical, surgical, family and social) and exam findings with the Medical Resident assigned to Chantel Tripp. I have personally performed and/or participated in the history, exam, medical decision making, and procedures and agree with all pertinent clinical information and any additional changes or corrections are noted below in my assessment and plan. I have discussed this patient in detail with the resident, and provided the instruction and education,       I have reviewed my findings and recommendations with the assigned Medical Resident, Chantel Tripp and members of family present at the time of disposition. I have performed a history and physical examination of this patient and directly supervised the resident caring for this patient         I directly supervised any procedures performed by the resident and was present for the procedure including all critical portions of the procedure             Department of Emergency Medicine   ED  Provider Note  Admit Date/RoomTime: 11/7/2022  7:52 AM  ED Room: 4679/5143-U          History of Present Illness:  11/7/22, Time: 9:04 AM EST  Chief Complaint   Patient presents with    Abdominal Pain     Lower abdomen and groin pain. Nausea and vomiting for the last 3 days. Hx of hernia. Chantel Tripp is a 80 y.o. male presenting to the ED for right groin hernia pain, beginning a few days ago. The complaint has been constant, moderate in severity, and worsened by nothing.   Patient reports that he has a chronic right inguinal hernia, he reports that for the last 3 days has had increasing pain at the hernia site with nonbloody, nonbilious emesis. He says he is not really having bowel movements. He reports the pain is increased which is what prompted his visit. He follows with Dr. Carlos Melgoza. He denies fever or chills. He denies chest pain or shortness of breath. No back pain. He denies any other complaints. Review of Systems:   A complete review of systems was performed and pertinent positives and negatives are stated within HPI, all other systems reviewed and are negative.        --------------------------------------------- PAST HISTORY ---------------------------------------------  Past Medical History:  has a past medical history of Asthma, CAD (coronary artery disease), COPD (chronic obstructive pulmonary disease) ([facility] [address] HIGH CHOLESTEROL, History of DVT (deep vein thrombosis), Hx of blood clots, Hypertension, Thyroid disease, and TIA (transient ischemic attack). Past Surgical History:  has a past surgical history that includes Tonsillectomy; Appendectomy; Gallbladder surgery; hip surgery; back surgery; Hand surgery; Cholecystectomy; joint replacement; Diagnostic Cardiac Cath Lab Procedure (2/2004); Coronary artery bypass graft (3/2004); Carotid endarterectomy ([id number]); cardiovascular stress test; transthoracic echocardiogram (2007); Cataract removal; Cardiac catheterization (11/16/2017); eye surgery; cyst removal; and other surgical history (12/13/2017). Social History:  reports that he quit smoking about 18 years ago. His smoking use included cigarettes. He has a 7.50 pack-year smoking history. He has never used smokeless tobacco. He reports current alcohol use of about 4.0 standard drinks per week. He reports that he does not use drugs. Family History: family history includes Heart Disease in his father and mother. . Unless otherwise noted, family history is non contributory    The patients home medications have been reviewed.     Allergies: Patient has no known allergies. I have reviewed the past medical history, past surgical history, social history, and family history    ---------------------------------------------------PHYSICAL EXAM--------------------------------------    Constitutional/General: Alert and oriented x3  Head: Normocephalic and atraumatic  Eyes: PERRL, EOMI, sclera non icteric  ENT: Oropharynx clear, handling secretions  Neck: Supple, full ROM, no stridor, no meningeal signs  Respiratory: Lungs clear to auscultation bilaterally, no wheezes, rales, or rhonchi. Not in respiratory distress  Cardiovascular:  Regular rate. Regular rhythm. No murmurs, no gallops, no rubs. 2+ distal pulses. Equal extremity pulses. Gastrointestinal:  Abdomen Soft, tenderness along the right groin along the inguinal canal with a large nonreducible tender inguinal hernia that is firm to the touch. There is no overlying skin changes. No discoloration of the skin. There is no other abdominal tenderness. Non distended. No rebound, guarding, or rigidity. No pulsatile masses. Musculoskeletal: Moves all extremities x 4. Warm and well perfused, no clubbing, no cyanosis, no edema. Capillary refill <3 seconds  Skin: skin warm and dry. No rashes. Neurologic: GCS 15, no focal deficits  Psychiatric: Normal Affect          -------------------------------------------------- RESULTS -------------------------------------------------  Results are listed below.      LABS: (Lab results interpreted by me)  Results for orders placed or performed during the hospital encounter of 11/07/22   CBC with Auto Differential   Result Value Ref Range    WBC 9.7 4.5 - 11.5 E9/L    RBC 4.78 3.80 - 5.80 E12/L    Hemoglobin 14.2 12.5 - 16.5 g/dL    Hematocrit 42.7 37.0 - 54.0 %    MCV 89.3 80.0 - 99.9 fL    MCH 29.7 26.0 - 35.0 pg    MCHC 33.3 32.0 - 34.5 %    RDW 13.2 11.5 - 15.0 fL    Platelets 803 667 - 183 E9/L    MPV 8.9 7.0 - 12.0 fL    Neutrophils % 81.8 (H) 43.0 - 80.0 % Immature Granulocytes % 0.5 0.0 - 5.0 %    Lymphocytes % 9.8 (L) 20.0 - 42.0 %    Monocytes % 7.4 2.0 - 12.0 %    Eosinophils % 0.3 0.0 - 6.0 %    Basophils % 0.2 0.0 - 2.0 %    Neutrophils Absolute 7.92 (H) 1.80 - 7.30 E9/L    Immature Granulocytes # 0.05 E9/L    Lymphocytes Absolute 0.95 (L) 1.50 - 4.00 E9/L    Monocytes Absolute 0.72 0.10 - 0.95 E9/L    Eosinophils Absolute 0.03 (L) 0.05 - 0.50 E9/L    Basophils Absolute 0.02 0.00 - 0.20 E9/L   Comprehensive Metabolic Panel   Result Value Ref Range    Sodium 134 132 - 146 mmol/L    Potassium 5.0 3.5 - 5.0 mmol/L    Chloride 100 98 - 107 mmol/L    CO2 21 (L) 22 - 29 mmol/L    Anion Gap 13 7 - 16 mmol/L    Glucose 121 (H) 74 - 99 mg/dL    BUN 42 (H) 6 - 23 mg/dL    Creatinine 1.7 (H) 0.7 - 1.2 mg/dL    Est, Glom Filt Rate 37 >=60 mL/min/1.73    Calcium 9.4 8.6 - 10.2 mg/dL    Total Protein 6.6 6.4 - 8.3 g/dL    Albumin 3.9 3.5 - 5.2 g/dL    Total Bilirubin 0.8 0.0 - 1.2 mg/dL    Alkaline Phosphatase 87 40 - 129 U/L    ALT 16 0 - 40 U/L    AST 23 0 - 39 U/L   Lipase   Result Value Ref Range    Lipase 16 13 - 60 U/L   SPECIMEN REJECTION   Result Value Ref Range    Rejected Test lacid     Reason for Rejection see below    TYPE AND SCREEN   Result Value Ref Range    ABO/Rh O POS     Antibody Screen NEG    ,       RADIOLOGY:    Radiologist interpretation  CT ABDOMEN PELVIS WO CONTRAST Additional Contrast? None   Final Result   Small-bowel obstruction with transition point in right inguinal hernia, free   fluid visualized underlying the obstructed small bowel worrisome for   incarceration would recommend surgical consultation.       RECOMMENDATIONS:   Recommend surgical consultation.               ------------------------- NURSING NOTES AND VITALS REVIEWED ---------------------------   The nursing notes within the ED encounter and vital signs as below have been reviewed by myself  BP (!) 145/70   Pulse 79   Temp 98 °F (36.7 °C) (Temporal)   Resp 19   Ht 5' 11\" (1.803 m)   Wt 163 lb (73.9 kg)   SpO2 99%   BMI 22.73 kg/m²     Oxygen Saturation Interpretation: Normal    The patients available past medical records and past encounters were reviewed. ------------------------------ ED COURSE/MEDICAL DECISION MAKING----------------------           Dr. Sri TITUS, am the primary provider of record        Medical Decision Making:   Incarcerated inguinal hernia, stat general surgery consult. Also signs of bowel obstruction. To the operating room with general surgery.       Name and Route of medications administered in the ED:  Medications   iopamidol (ISOVUE-300) 61 % injection 50 mL (has no administration in time range)   lactated ringers infusion ( IntraVENous New Bag 11/7/22 1311)   cefOXitin (MEFOXIN) 2,000 mg in sodium chloride 0.9 % 50 mL IVPB ([id number]) (0 mg IntraVENous Stopped 11/7/22 1805)   albuterol (PROVENTIL) nebulizer solution 2.5 mg (has no administration in time range)   busPIRone (BUSPAR) tablet 5 mg (has no administration in time range)   levothyroxine (SYNTHROID) tablet 125 mcg (125 mcg Oral Not Given 11/7/22 1833)   tamsulosin (FLOMAX) capsule 0.4 mg (0.4 mg Oral Given 11/7/22 2050)   ondansetron (ZOFRAN-ODT) disintegrating tablet 4 mg (has no administration in time range)     Or   ondansetron (ZOFRAN) injection 4 mg (has no administration in time range)   acetaminophen (TYLENOL) tablet 650 mg (has no administration in time range)   oxyCODONE (ROXICODONE) immediate release tablet 2.5 mg (has no administration in time range)   heparin (porcine) injection 5,000 Units (has no administration in time range)   atorvastatin (LIPITOR) tablet 40 mg (40 mg Oral Given 11/7/22 2050)   metoprolol succinate (TOPROL XL) extended release tablet 25 mg (25 mg Oral Given 11/7/22 1849)   ondansetron (ZOFRAN-ODT) disintegrating tablet 4 mg (4 mg Oral Given 11/7/22 0940)   fentaNYL (SUBLIMAZE) injection 25 mcg (25 mcg IntraVENous Given 11/7/22 1100)   0.9 % sodium chloride bolus (0 mLs IntraVENous Stopped 11/7/22 1312)              Re-Evaluations:  ED Course as of 11/07/22 2105   Mon Nov 07, 2022   1145 CT ABDOMEN PELVIS WO CONTRAST Additional Contrast? None [AL]      ED Course User Index  [AL] Tiffanie Sadler MD     Consultations:     --------------------------------- IMPRESSION AND DISPOSITION ---------------------------------    IMPRESSION  1. Inguinal hernia, right        DISPOSITION  Disposition: Admit to operating room  Patient condition is serious        NOTE: This report was transcribed using voice recognition software.  Every effort was made to ensure accuracy; however, inadvertent computerized transcription errors may be present       Cathleen Brennan MD  11/07/22 2107

## 2022-11-08 VITALS
HEIGHT: 71 IN | RESPIRATION RATE: 20 BRPM | DIASTOLIC BLOOD PRESSURE: 51 MMHG | SYSTOLIC BLOOD PRESSURE: 108 MMHG | BODY MASS INDEX: 23.8 KG/M2 | WEIGHT: 170 LBS | TEMPERATURE: 98.1 F | OXYGEN SATURATION: 97 % | HEART RATE: 70 BPM

## 2022-11-08 PROBLEM — E44.0 MODERATE PROTEIN-CALORIE MALNUTRITION (HCC): Chronic | Status: ACTIVE | Noted: 2022-11-08

## 2022-11-08 LAB
ALBUMIN SERPL-MCNC: 3.5 G/DL (ref 3.5–5.2)
ALBUMIN SERPL-MCNC: 3.5 G/DL (ref 3.5–5.2)
ALP BLD-CCNC: 73 U/L (ref 40–129)
ALP BLD-CCNC: 75 U/L (ref 40–129)
ALT SERPL-CCNC: 13 U/L (ref 0–40)
ALT SERPL-CCNC: 13 U/L (ref 0–40)
ANION GAP SERPL CALCULATED.3IONS-SCNC: 12 MMOL/L (ref 7–16)
ANION GAP SERPL CALCULATED.3IONS-SCNC: 13 MMOL/L (ref 7–16)
AST SERPL-CCNC: 18 U/L (ref 0–39)
AST SERPL-CCNC: 19 U/L (ref 0–39)
BASOPHILS ABSOLUTE: 0.01 E9/L (ref 0–0.2)
BASOPHILS ABSOLUTE: 0.01 E9/L (ref 0–0.2)
BASOPHILS RELATIVE PERCENT: 0.1 % (ref 0–2)
BASOPHILS RELATIVE PERCENT: 0.1 % (ref 0–2)
BILIRUB SERPL-MCNC: 0.6 MG/DL (ref 0–1.2)
BILIRUB SERPL-MCNC: 0.6 MG/DL (ref 0–1.2)
BUN BLDV-MCNC: 41 MG/DL (ref 6–23)
BUN BLDV-MCNC: 42 MG/DL (ref 6–23)
CALCIUM SERPL-MCNC: 8.8 MG/DL (ref 8.6–10.2)
CALCIUM SERPL-MCNC: 8.8 MG/DL (ref 8.6–10.2)
CHLORIDE BLD-SCNC: 100 MMOL/L (ref 98–107)
CHLORIDE BLD-SCNC: 101 MMOL/L (ref 98–107)
CO2: 19 MMOL/L (ref 22–29)
CO2: 20 MMOL/L (ref 22–29)
CREAT SERPL-MCNC: 1.6 MG/DL (ref 0.7–1.2)
CREAT SERPL-MCNC: 1.6 MG/DL (ref 0.7–1.2)
EOSINOPHILS ABSOLUTE: 0.01 E9/L (ref 0.05–0.5)
EOSINOPHILS ABSOLUTE: 0.02 E9/L (ref 0.05–0.5)
EOSINOPHILS RELATIVE PERCENT: 0.1 % (ref 0–6)
EOSINOPHILS RELATIVE PERCENT: 0.2 % (ref 0–6)
GFR SERPL CREATININE-BSD FRML MDRD: 40 ML/MIN/1.73
GFR SERPL CREATININE-BSD FRML MDRD: 40 ML/MIN/1.73
GLUCOSE BLD-MCNC: 110 MG/DL (ref 74–99)
GLUCOSE BLD-MCNC: 148 MG/DL (ref 74–99)
HCT VFR BLD CALC: 35.2 % (ref 37–54)
HCT VFR BLD CALC: 36 % (ref 37–54)
HEMOGLOBIN: 11.8 G/DL (ref 12.5–16.5)
HEMOGLOBIN: 12.2 G/DL (ref 12.5–16.5)
IMMATURE GRANULOCYTES #: 0.05 E9/L
IMMATURE GRANULOCYTES #: 0.06 E9/L
IMMATURE GRANULOCYTES %: 0.4 % (ref 0–5)
IMMATURE GRANULOCYTES %: 0.5 % (ref 0–5)
LYMPHOCYTES ABSOLUTE: 0.79 E9/L (ref 1.5–4)
LYMPHOCYTES ABSOLUTE: 0.95 E9/L (ref 1.5–4)
LYMPHOCYTES RELATIVE PERCENT: 6.4 % (ref 20–42)
LYMPHOCYTES RELATIVE PERCENT: 7.6 % (ref 20–42)
MCH RBC QN AUTO: 30.5 PG (ref 26–35)
MCH RBC QN AUTO: 30.7 PG (ref 26–35)
MCHC RBC AUTO-ENTMCNC: 33.5 % (ref 32–34.5)
MCHC RBC AUTO-ENTMCNC: 33.9 % (ref 32–34.5)
MCV RBC AUTO: 90.5 FL (ref 80–99.9)
MCV RBC AUTO: 91 FL (ref 80–99.9)
MONOCYTES ABSOLUTE: 1.05 E9/L (ref 0.1–0.95)
MONOCYTES ABSOLUTE: 1.28 E9/L (ref 0.1–0.95)
MONOCYTES RELATIVE PERCENT: 10.4 % (ref 2–12)
MONOCYTES RELATIVE PERCENT: 8.4 % (ref 2–12)
NEUTROPHILS ABSOLUTE: 10.14 E9/L (ref 1.8–7.3)
NEUTROPHILS ABSOLUTE: 10.43 E9/L (ref 1.8–7.3)
NEUTROPHILS RELATIVE PERCENT: 82.6 % (ref 43–80)
NEUTROPHILS RELATIVE PERCENT: 83.2 % (ref 43–80)
PDW BLD-RTO: 13.2 FL (ref 11.5–15)
PDW BLD-RTO: 13.3 FL (ref 11.5–15)
PLATELET # BLD: 211 E9/L (ref 130–450)
PLATELET # BLD: 229 E9/L (ref 130–450)
PMV BLD AUTO: 8.9 FL (ref 7–12)
PMV BLD AUTO: 9 FL (ref 7–12)
POTASSIUM REFLEX MAGNESIUM: 4.6 MMOL/L (ref 3.5–5)
POTASSIUM REFLEX MAGNESIUM: 4.7 MMOL/L (ref 3.5–5)
RBC # BLD: 3.87 E12/L (ref 3.8–5.8)
RBC # BLD: 3.98 E12/L (ref 3.8–5.8)
SODIUM BLD-SCNC: 132 MMOL/L (ref 132–146)
SODIUM BLD-SCNC: 133 MMOL/L (ref 132–146)
TOTAL PROTEIN: 6.1 G/DL (ref 6.4–8.3)
TOTAL PROTEIN: 6.1 G/DL (ref 6.4–8.3)
WBC # BLD: 12.3 E9/L (ref 4.5–11.5)
WBC # BLD: 12.5 E9/L (ref 4.5–11.5)

## 2022-11-08 PROCEDURE — 6360000002 HC RX W HCPCS: Performed by: STUDENT IN AN ORGANIZED HEALTH CARE EDUCATION/TRAINING PROGRAM

## 2022-11-08 PROCEDURE — 36415 COLL VENOUS BLD VENIPUNCTURE: CPT

## 2022-11-08 PROCEDURE — 51798 US URINE CAPACITY MEASURE: CPT

## 2022-11-08 PROCEDURE — 80053 COMPREHEN METABOLIC PANEL: CPT

## 2022-11-08 PROCEDURE — 6370000000 HC RX 637 (ALT 250 FOR IP): Performed by: STUDENT IN AN ORGANIZED HEALTH CARE EDUCATION/TRAINING PROGRAM

## 2022-11-08 PROCEDURE — 6370000000 HC RX 637 (ALT 250 FOR IP)

## 2022-11-08 PROCEDURE — 2580000003 HC RX 258: Performed by: STUDENT IN AN ORGANIZED HEALTH CARE EDUCATION/TRAINING PROGRAM

## 2022-11-08 PROCEDURE — 85025 COMPLETE CBC W/AUTO DIFF WBC: CPT

## 2022-11-08 RX ORDER — OXYCODONE HYDROCHLORIDE AND ACETAMINOPHEN 5; 325 MG/1; MG/1
1 TABLET ORAL EVERY 6 HOURS PRN
Qty: 20 TABLET | Refills: 0 | Status: SHIPPED | OUTPATIENT
Start: 2022-11-08 | End: 2022-11-13

## 2022-11-08 RX ADMIN — HEPARIN SODIUM 5000 UNITS: 10000 INJECTION INTRAVENOUS; SUBCUTANEOUS at 05:41

## 2022-11-08 RX ADMIN — SODIUM CHLORIDE, POTASSIUM CHLORIDE, SODIUM LACTATE AND CALCIUM CHLORIDE: 600; 310; 30; 20 INJECTION, SOLUTION INTRAVENOUS at 01:46

## 2022-11-08 RX ADMIN — CEFOXITIN SODIUM 2000 MG: 2 POWDER, FOR SOLUTION INTRAVENOUS at 00:02

## 2022-11-08 RX ADMIN — LEVOTHYROXINE SODIUM 125 MCG: 0.12 TABLET ORAL at 08:19

## 2022-11-08 RX ADMIN — METOPROLOL SUCCINATE 25 MG: 50 TABLET, EXTENDED RELEASE ORAL at 08:19

## 2022-11-08 RX ADMIN — FLUOCINONIDE: 0.5 CREAM TOPICAL at 08:19

## 2022-11-08 ASSESSMENT — PAIN SCALES - GENERAL
PAINLEVEL_OUTOF10: 0
PAINLEVEL_OUTOF10: 0

## 2022-11-08 NOTE — PLAN OF CARE
Problem: Discharge Planning  Goal: Discharge to home or other facility with appropriate resources  Outcome: Progressing     Problem: Pain  Goal: Verbalizes/displays adequate comfort level or baseline comfort level  Outcome: Progressing  Flowsheets (Taken 11/8/2022 0000)  Verbalizes/displays adequate comfort level or baseline comfort level: Encourage patient to monitor pain and request assistance     Problem: Skin/Tissue Integrity  Goal: Absence of new skin breakdown  Description: 1. Monitor for areas of redness and/or skin breakdown  2. Assess vascular access sites hourly  3. Every 4-6 hours minimum:  Change oxygen saturation probe site  4. Every 4-6 hours:  If on nasal continuous positive airway pressure, respiratory therapy assess nares and determine need for appliance change or resting period.   Outcome: Progressing     Problem: Safety - Adult  Goal: Free from fall injury  Outcome: Progressing     Problem: ABCDS Injury Assessment  Goal: Absence of physical injury  Outcome: Progressing

## 2022-11-08 NOTE — PROGRESS NOTES
Patient with no urge to void after DTV window completed. Patient bladder scanned for 300cc. Patient states no discomfort or urge to void at this time. Family at bedside. Patient and family educated on straight cath. Patient and family would like a little more time to void on his own to avoid straight cath. Mil Harper RN

## 2022-11-08 NOTE — CARE COORDINATION
Transition of care: POD#1 lap inguinal hernia repair. Met with pt and pt's daughter in law, Marely Garg, and grandson, Shobha, in room. Pt lives with his daughter, Trace Mendoza, in a ranch style home. Has ramped entrance to home. Needs assistance x 1 with ADLs. Family provide transportation. DME- rollator, power chair, electric wheelchair, tub transfer bench. Pt uses the wheelchair at home and will use the rollator to stand and pivot. Pt is a  and goes to [place] [facility] on [address] [facility] [address] and sees Dr Ambrose Guzman. They are working with the [place] to get a stair lift installed from 1st floor to basement. Plan is for pt to return home with Trace Mendoza. Family will provide transportation. Voiced no needs for home. PCP is Dr. Rachell Toth and pharmacy is the [place] [facility] [place]. Sw/cm will follow.

## 2022-11-08 NOTE — PROGRESS NOTES
Comprehensive Nutrition Assessment    Type and Reason for Visit:  Initial, Wound, Consult    Nutrition Recommendations/Plan:   Recommend and start Ensure high protein supplement BID and Hayden wound healing supplement BID to help meet increased nutritional needs from wound healing. Malnutrition Assessment:  Malnutrition Status: Moderate malnutrition (11/08/22 1104)    Context:  Chronic Illness     Findings of the 6 clinical characteristics of malnutrition:  Energy Intake:  75% or less estimated energy requirements for 1 month or longer  Weight Loss:  No significant weight loss     Body Fat Loss:  Mild body fat loss Orbital, Triceps   Muscle Mass Loss:  Mild muscle mass loss Clavicles (pectoralis & deltoids), Hand (interosseous)  Fluid Accumulation:  No significant fluid accumulation     Strength:  Not Performed    Nutrition Assessment:    Patient adm w/ abd pain and N/V x 3 days PTA ; noted incarcerated inguinal hernia on right causing SBO s/p lap inguinal hernia repair on 11/7 ; hx of COPD/CAD/TIA/DVT ; pt also meets criteria for moderate malnutrition ; will start ONS    Nutrition Related Findings:    +I&Os (+1.3 L), no edema, hypoactive BS, cramping, distended abd, A&O x 4, redness to buttocks/heels, mild muscle/fat wasting ; Wound Type: Open Wounds, Multiple, Surgical Incision, Wound Consult Pending (wound x 1 ; Incisions x 3)       Current Nutrition Intake & Therapies:    Average Meal Intake: 51-75%     ADULT DIET; Regular    Anthropometric Measures:  Height: 5' 11\" (180.3 cm)  Ideal Body Weight (IBW): 172 lbs (78 kg)       Current Body Weight: 170 lb (77.1 kg) (11/8, bedscale per RD), 98.8 % IBW.  Weight Source: Bed Scale  Current BMI (kg/m2): 23.7  Usual Body Weight:  (EMR shows past weights of 163# actual on 11/1/21 and 177# no method on 11/10/20)                       BMI Categories: Normal Weight (BMI 22.0 to 24.9) age over 72    Estimated Daily Nutrient Needs:  Energy Requirements Based On: Formula  Weight Used for Energy Requirements: Current  Energy (kcal/day): 1171-0143 (REE 1445 x 1.2 SF)  Weight Used for Protein Requirements: Current  Protein (g/day):  (1.2-1.4g/kg CBW)  Method Used for Fluid Requirements: 1 ml/kcal  Fluid (ml/day): 5948-9093    Nutrition Diagnosis:   Moderate malnutrition, In context of chronic illness related to catabolic illness (hx of COPD) as evidenced by poor intake prior to admission, mild muscle loss, mild loss of subcutaneous fat    Nutrition Interventions:   Food and/or Nutrient Delivery: Continue Current Diet, Start Oral Nutrition Supplement  Nutrition Education/Counseling: Education not indicated  Coordination of Nutrition Care: Continue to monitor while inpatient       Goals:  Previous Goal Met: Progressing toward Goal(s)  Goals: PO intake 75% or greater, by next RD assessment       Nutrition Monitoring and Evaluation:   Behavioral-Environmental Outcomes: None Identified  Food/Nutrient Intake Outcomes: Food and Nutrient Intake, Supplement Intake  Physical Signs/Symptoms Outcomes: Biochemical Data, Chewing or Swallowing, GI Status, Fluid Status or Edema, Hemodynamic Status, Meal Time Behavior, Nutrition Focused Physical Findings, Skin, Weight, Nausea or Vomiting    Discharge Planning:     Too soon to determine     Daija Dickson RD, LD  Contact: [phone]

## 2022-11-08 NOTE — PROGRESS NOTES
Support service notified of patient having stat labs that need to be drawn. Per resident patient needs labs prior to d/c today.

## 2022-11-08 NOTE — ANESTHESIA POSTPROCEDURE EVALUATION
Department of Anesthesiology  Postprocedure Note    Patient: Brooklynn Wooten  MRN: [id number]  YOB: 1930  Date of evaluation: 11/8/2022      Procedure Summary     Date: 11/07/22 Room / Location: [facility] OR 05 / [facility]    Anesthesia Start: 3875 Anesthesia Stop: 2715    Procedure: LAPAROSCOPIC RIGHT INGUINAL HERNIA REPAIR WITH MESH (Right: Groin) Diagnosis:       Inguinal hernia with obstruction without gangrene, recurrence not specified, unspecified laterality      (Inguinal hernia with obstruction without gangrene, recurrence not specified, unspecified laterality [K40.30])    Surgeons: Kira Crowell MD Responsible Provider: Marie Luna MD    Anesthesia Type: general ASA Status: 4 - Emergent          Anesthesia Type: No value filed.     French Phase I: French Score: 9    French Phase II:        Anesthesia Post Evaluation    Patient location during evaluation: PACU  Patient participation: complete - patient participated  Level of consciousness: awake and alert  Airway patency: patent  Nausea & Vomiting: no nausea and no vomiting  Complications: no  Cardiovascular status: blood pressure returned to baseline and hemodynamically stable  Respiratory status: acceptable and spontaneous ventilation  Hydration status: euvolemic  Multimodal analgesia pain management approach

## 2022-11-08 NOTE — PROGRESS NOTES
GENERAL SURGERY  DAILY PROGRESS NOTE  11/8/2022    CHIEF COMPLAINT:  Chief Complaint   Patient presents with    Abdominal Pain     Lower abdomen and groin pain. Nausea and vomiting for the last 3 days. Hx of hernia. SUBJECTIVE:  Pain well controlled, doing well this am.  No nausea or vomiting +UOP after nixon removed. Tolerating clears yesterday. Afebrile, hemodynamically stable   BUN/Cr 42/1.7      OBJECTIVE:  BP (!) 117/53   Pulse 69   Temp 97.9 °F (36.6 °C) (Temporal)   Resp 18   Ht 5' 11\" (1.803 m)   Wt 163 lb (73.9 kg)   SpO2 97%   BMI 22.73 kg/m²     GENERAL:  NAD. A&Ox3. LUNGS:  No increased work of breathing. CARDIOVASCULAR: RR  ABDOMEN:  Soft, non-distended, appropriately tender. No guarding, rigidity, rebound.     ASSESSMENT/PLAN:  80 y.o. male with incarcerated inguinal hernia on right s/p lap inguinal hernia repair 11/7    Advance diet   Pain and nausea control as needed   PT/OT  Home meds   Stop IVF  Cepacol for sore throat   DC later today if he tolerates his diet       Christian Recio MD  Surgery Resident PGY-2  11/8/2022  5:50 AM

## 2022-11-08 NOTE — DISCHARGE INSTRUCTIONS
SURGERY DISCHARGE INSTRUCTIONS    You may be drowsy or lightheaded after receiving sedation or anesthesia. A responsible person should be with you for the next 24 hours. FOLLOW UP: Call office to schedule follow-up appointment in 2 weeks. DIET: Advance your diet as tolerated. Start with light diet and progress to your normal diet as you feel like eating. If you experience nausea or repeated episodes of vomiting which persist beyond 12-24 hours, notify your doctor. ACTIVITY: Rest today. Increase activity gradually. Recommend walking every day to help with return of bowel function and healing. No heavy lifting or strenuous activity for 4 weeks if you had a surgical procedure - nothing over 15 lbs. No driving for 24 hours after a procedure. No driving while on prescription pain medication. SHOWER/BATHING: Okay to shower in 24 hours after procedure. No tub bathing, soaking, or swimming for 2 weeks. WOUND CARE: You have a clean dressing applied. You may remove this dressing in 24 hours. You do not need a new dressing, but may apply one if you feel that your incisions are drainage. You have Dermabond dressing (skin glue) applied to your incisions with sutures underneath your skin. The glue will gradually work itself off over the next few weeks and the stitches will dissolve on their own. You do not need to apply anything over them. Avoid directly applying lotions, creams or oils to your incision. Keep incisions clean and dry. Always ensure you and your care giver clean hands before and after caring for the wound. You may place ice on incisions to decrease the pain and bruising. MEDICATIONS: Take as prescribed. Pain from the incision(s) is normal. The pain will vary from day to day and with any changes in your activity level, but it should gradually decrease over time.  If you were given a prescription for a narcotic pain medication (percocet, norco, etc) you should NOT drink alcohol, drive, or operate any machinery. Do not take the narcotic medication if you are not having pain. If your pain is mild, you may take over-the-counter ibuprofen or tylenol for pain as directed, limit total amount of acetaminophen (tylenol) to 3 grams per 24-hour period and please note that NSAIDs (ibuprofen) can cause bleeding or stomach upset. You may experience constipation while taking pain medication, strongly recommended to take over-the-counter stool softeners (Docusate/Colace or Senokot-S) while using pain medications - use as directed on bottle. Okay to resume anticoagulant medication after 24hrs.       SPECIAL INSTRUCTIONS:   Call physician if you have any questions, to schedule a follow-up appointment, and if you notice any of the following:  Fever (temperature over 101ºF) or chills  Persistent nausea, vomiting, or bloating  Severe pain that is not relieved by the prescribed pain medication  Swelling or redness around your incision(s)  No bowel movement and feeling uncomfortable  Significant change in urination or no urine output for 8 hours  Excess bleeding (from the rectum or incision) - more than ½ cup that does not stop

## 2022-11-08 NOTE — PROGRESS NOTES
Called lab regarding morning labs not being resulted in the computer.  Labs were drawn for pt at 6:30am and they will call me back if they can not be found

## 2022-11-08 NOTE — PATIENT CARE CONFERENCE
P Quality Flow/Interdisciplinary Rounds Progress Note        Quality Flow Rounds held on November 8, 2022    Disciplines Attending:  Bedside Nurse, , , and Nursing Unit Leadership    Chantel Tripp was admitted on 11/7/2022  7:52 AM    Anticipated Discharge Date:       Disposition:    Gustavo Score:  Gustavo Scale Score: 17    Readmission Risk              Risk of Unplanned Readmission:  11           Discussed patient goal for the day, patient clinical progression, and barriers to discharge.   The following Goal(s) of the Day/Commitment(s) have been identified:  Activity Progression  increase      Hiram Moctezuma RN  November 8, 2022

## 2022-11-08 NOTE — ACP (ADVANCE CARE PLANNING)
.Advance Care Planning   Healthcare Decision Maker:    Primary Decision Maker: Christiano Trevizo Child - [phone]    Secondary Decision Maker: Aristides Arauz - Child - [phone]    Click here to complete Healthcare Decision Makers including selection of the Healthcare Decision Maker Relationship (ie \"Primary\").

## 2024-04-18 ENCOUNTER — APPOINTMENT (OUTPATIENT)
Dept: GENERAL RADIOLOGY | Age: 89
End: 2024-04-18
Payer: COMMERCIAL

## 2024-04-18 ENCOUNTER — HOSPITAL ENCOUNTER (INPATIENT)
Age: 89
LOS: 1 days | Discharge: HOME | End: 2024-04-19
Attending: EMERGENCY MEDICINE | Admitting: STUDENT IN AN ORGANIZED HEALTH CARE EDUCATION/TRAINING PROGRAM
Payer: COMMERCIAL

## 2024-04-18 DIAGNOSIS — I48.91 ATRIAL FIBRILLATION, UNSPECIFIED TYPE (HCC): ICD-10-CM

## 2024-04-18 DIAGNOSIS — I50.9 CONGESTIVE HEART FAILURE, UNSPECIFIED HF CHRONICITY, UNSPECIFIED HEART FAILURE TYPE (HCC): Primary | ICD-10-CM

## 2024-04-18 LAB
ANION GAP SERPL CALCULATED.3IONS-SCNC: 14 MMOL/L (ref 7–16)
ANION GAP SERPL CALCULATED.3IONS-SCNC: 15 MMOL/L (ref 7–16)
BASOPHILS # BLD: 0.06 K/UL (ref 0–0.2)
BASOPHILS NFR BLD: 1 % (ref 0–2)
BILIRUB UR QL STRIP: NEGATIVE
BNP SERPL-MCNC: ABNORMAL PG/ML (ref 0–450)
BUN SERPL-MCNC: 78 MG/DL (ref 6–23)
BUN SERPL-MCNC: 79 MG/DL (ref 6–23)
CALCIUM SERPL-MCNC: 8.5 MG/DL (ref 8.6–10.2)
CALCIUM SERPL-MCNC: 8.8 MG/DL (ref 8.6–10.2)
CHLORIDE SERPL-SCNC: 100 MMOL/L (ref 98–107)
CHLORIDE SERPL-SCNC: 101 MMOL/L (ref 98–107)
CLARITY UR: CLEAR
CO2 SERPL-SCNC: 21 MMOL/L (ref 22–29)
CO2 SERPL-SCNC: 21 MMOL/L (ref 22–29)
COLOR UR: YELLOW
CREAT SERPL-MCNC: 1.9 MG/DL (ref 0.7–1.2)
CREAT SERPL-MCNC: 1.9 MG/DL (ref 0.7–1.2)
EKG ATRIAL RATE: 96 BPM
EKG Q-T INTERVAL: 392 MS
EKG QRS DURATION: 112 MS
EKG QTC CALCULATION (BAZETT): 463 MS
EKG R AXIS: -17 DEGREES
EKG T AXIS: 154 DEGREES
EKG VENTRICULAR RATE: 84 BPM
EOSINOPHIL # BLD: 0.05 K/UL (ref 0.05–0.5)
EOSINOPHILS RELATIVE PERCENT: 1 % (ref 0–6)
EPI CELLS #/AREA URNS HPF: NORMAL /HPF
ERYTHROCYTE [DISTWIDTH] IN BLOOD BY AUTOMATED COUNT: 15.9 % (ref 11.5–15)
GFR SERPL CREATININE-BSD FRML MDRD: 33 ML/MIN/1.73M2
GFR SERPL CREATININE-BSD FRML MDRD: 34 ML/MIN/1.73M2
GLUCOSE SERPL-MCNC: 162 MG/DL (ref 74–99)
GLUCOSE SERPL-MCNC: 166 MG/DL (ref 74–99)
GLUCOSE UR STRIP-MCNC: NEGATIVE MG/DL
HCT VFR BLD AUTO: 41.8 % (ref 37–54)
HGB BLD-MCNC: 13.4 G/DL (ref 12.5–16.5)
HGB UR QL STRIP.AUTO: NEGATIVE
IMM GRANULOCYTES # BLD AUTO: 0.51 K/UL (ref 0–0.58)
IMM GRANULOCYTES NFR BLD: 5 % (ref 0–5)
KETONES UR STRIP-MCNC: NEGATIVE MG/DL
LEUKOCYTE ESTERASE UR QL STRIP: NEGATIVE
LYMPHOCYTES NFR BLD: 0.57 K/UL (ref 1.5–4)
LYMPHOCYTES RELATIVE PERCENT: 6 % (ref 20–42)
MAGNESIUM SERPL-MCNC: 2 MG/DL (ref 1.6–2.6)
MCH RBC QN AUTO: 29.8 PG (ref 26–35)
MCHC RBC AUTO-ENTMCNC: 32.1 G/DL (ref 32–34.5)
MCV RBC AUTO: 93.1 FL (ref 80–99.9)
MONOCYTES NFR BLD: 0.48 K/UL (ref 0.1–0.95)
MONOCYTES NFR BLD: 5 % (ref 2–12)
NEUTROPHILS NFR BLD: 83 % (ref 43–80)
NEUTS SEG NFR BLD: 8.25 K/UL (ref 1.8–7.3)
NITRITE UR QL STRIP: NEGATIVE
PH UR STRIP: 5.5 [PH] (ref 5–9)
PLATELET # BLD AUTO: 140 K/UL (ref 130–450)
PMV BLD AUTO: 9.4 FL (ref 7–12)
POTASSIUM SERPL-SCNC: 4.8 MMOL/L (ref 3.5–5)
POTASSIUM SERPL-SCNC: 5.4 MMOL/L (ref 3.5–5)
PROT UR STRIP-MCNC: NEGATIVE MG/DL
RBC # BLD AUTO: 4.49 M/UL (ref 3.8–5.8)
RBC # BLD: ABNORMAL 10*6/UL
RBC #/AREA URNS HPF: NORMAL /HPF
SODIUM SERPL-SCNC: 135 MMOL/L (ref 132–146)
SODIUM SERPL-SCNC: 137 MMOL/L (ref 132–146)
SP GR UR STRIP: 1.02 (ref 1–1.03)
T4 FREE SERPL-MCNC: 2.2 NG/DL (ref 0.9–1.7)
TROPONIN I SERPL HS-MCNC: 95 NG/L (ref 0–11)
TROPONIN I SERPL HS-MCNC: 98 NG/L (ref 0–11)
TSH SERPL DL<=0.05 MIU/L-ACNC: 0.09 UIU/ML (ref 0.27–4.2)
UROBILINOGEN UR STRIP-ACNC: 0.2 EU/DL (ref 0–1)
WBC #/AREA URNS HPF: NORMAL /HPF
WBC OTHER # BLD: 9.9 K/UL (ref 4.5–11.5)

## 2024-04-18 PROCEDURE — 2580000003 HC RX 258: Performed by: STUDENT IN AN ORGANIZED HEALTH CARE EDUCATION/TRAINING PROGRAM

## 2024-04-18 PROCEDURE — 6370000000 HC RX 637 (ALT 250 FOR IP): Performed by: STUDENT IN AN ORGANIZED HEALTH CARE EDUCATION/TRAINING PROGRAM

## 2024-04-18 PROCEDURE — 71045 X-RAY EXAM CHEST 1 VIEW: CPT

## 2024-04-18 PROCEDURE — 80048 BASIC METABOLIC PNL TOTAL CA: CPT

## 2024-04-18 PROCEDURE — 84443 ASSAY THYROID STIM HORMONE: CPT

## 2024-04-18 PROCEDURE — 96374 THER/PROPH/DIAG INJ IV PUSH: CPT

## 2024-04-18 PROCEDURE — 6370000000 HC RX 637 (ALT 250 FOR IP)

## 2024-04-18 PROCEDURE — 84439 ASSAY OF FREE THYROXINE: CPT

## 2024-04-18 PROCEDURE — 83880 ASSAY OF NATRIURETIC PEPTIDE: CPT

## 2024-04-18 PROCEDURE — 93005 ELECTROCARDIOGRAM TRACING: CPT

## 2024-04-18 PROCEDURE — 83735 ASSAY OF MAGNESIUM: CPT

## 2024-04-18 PROCEDURE — 93010 ELECTROCARDIOGRAM REPORT: CPT | Performed by: INTERNAL MEDICINE

## 2024-04-18 PROCEDURE — 99285 EMERGENCY DEPT VISIT HI MDM: CPT

## 2024-04-18 PROCEDURE — 84484 ASSAY OF TROPONIN QUANT: CPT

## 2024-04-18 PROCEDURE — 81001 URINALYSIS AUTO W/SCOPE: CPT

## 2024-04-18 PROCEDURE — 85025 COMPLETE CBC W/AUTO DIFF WBC: CPT

## 2024-04-18 PROCEDURE — 2140000000 HC CCU INTERMEDIATE R&B

## 2024-04-18 PROCEDURE — 6360000002 HC RX W HCPCS

## 2024-04-18 RX ORDER — SODIUM CHLORIDE 0.9 % (FLUSH) 0.9 %
10 SYRINGE (ML) INJECTION PRN
Status: DISCONTINUED | OUTPATIENT
Start: 2024-04-18 | End: 2024-04-19 | Stop reason: HOSPADM

## 2024-04-18 RX ORDER — LANOLIN ALCOHOL/MO/W.PET/CERES
3 CREAM (GRAM) TOPICAL NIGHTLY PRN
Status: DISCONTINUED | OUTPATIENT
Start: 2024-04-18 | End: 2024-04-19 | Stop reason: HOSPADM

## 2024-04-18 RX ORDER — ACETAMINOPHEN 650 MG/1
650 SUPPOSITORY RECTAL EVERY 6 HOURS PRN
Status: DISCONTINUED | OUTPATIENT
Start: 2024-04-18 | End: 2024-04-19 | Stop reason: HOSPADM

## 2024-04-18 RX ORDER — ASPIRIN 81 MG/1
324 TABLET, CHEWABLE ORAL ONCE
Status: COMPLETED | OUTPATIENT
Start: 2024-04-18 | End: 2024-04-18

## 2024-04-18 RX ORDER — METOPROLOL SUCCINATE 25 MG/1
25 TABLET, EXTENDED RELEASE ORAL NIGHTLY
Status: DISCONTINUED | OUTPATIENT
Start: 2024-04-18 | End: 2024-04-19 | Stop reason: HOSPADM

## 2024-04-18 RX ORDER — POTASSIUM CHLORIDE 20 MEQ/1
40 TABLET, EXTENDED RELEASE ORAL PRN
Status: DISCONTINUED | OUTPATIENT
Start: 2024-04-18 | End: 2024-04-19 | Stop reason: HOSPADM

## 2024-04-18 RX ORDER — METOPROLOL SUCCINATE 50 MG/1
50 TABLET, EXTENDED RELEASE ORAL DAILY
Status: DISCONTINUED | OUTPATIENT
Start: 2024-04-19 | End: 2024-04-19 | Stop reason: HOSPADM

## 2024-04-18 RX ORDER — BUDESONIDE AND FORMOTEROL FUMARATE DIHYDRATE 160; 4.5 UG/1; UG/1
1 AEROSOL RESPIRATORY (INHALATION)
Status: DISCONTINUED | OUTPATIENT
Start: 2024-04-18 | End: 2024-04-18 | Stop reason: CLARIF

## 2024-04-18 RX ORDER — ACETAMINOPHEN 325 MG/1
650 TABLET ORAL EVERY 6 HOURS PRN
Status: DISCONTINUED | OUTPATIENT
Start: 2024-04-18 | End: 2024-04-19 | Stop reason: HOSPADM

## 2024-04-18 RX ORDER — SODIUM CHLORIDE 9 MG/ML
INJECTION, SOLUTION INTRAVENOUS PRN
Status: DISCONTINUED | OUTPATIENT
Start: 2024-04-18 | End: 2024-04-19 | Stop reason: HOSPADM

## 2024-04-18 RX ORDER — ATORVASTATIN CALCIUM 40 MG/1
40 TABLET, FILM COATED ORAL EVERY OTHER DAY
Status: DISCONTINUED | OUTPATIENT
Start: 2024-04-19 | End: 2024-04-19 | Stop reason: HOSPADM

## 2024-04-18 RX ORDER — FUROSEMIDE 10 MG/ML
20 INJECTION INTRAMUSCULAR; INTRAVENOUS ONCE
Status: COMPLETED | OUTPATIENT
Start: 2024-04-18 | End: 2024-04-18

## 2024-04-18 RX ORDER — LEVOTHYROXINE SODIUM 0.1 MG/1
100 TABLET ORAL DAILY
Status: DISCONTINUED | OUTPATIENT
Start: 2024-04-19 | End: 2024-04-18

## 2024-04-18 RX ORDER — METOPROLOL SUCCINATE 25 MG/1
50 TABLET, EXTENDED RELEASE ORAL DAILY
Status: DISCONTINUED | OUTPATIENT
Start: 2024-04-18 | End: 2024-04-18

## 2024-04-18 RX ORDER — ONDANSETRON 4 MG/1
4 TABLET, ORALLY DISINTEGRATING ORAL EVERY 8 HOURS PRN
Status: DISCONTINUED | OUTPATIENT
Start: 2024-04-18 | End: 2024-04-19 | Stop reason: HOSPADM

## 2024-04-18 RX ORDER — FUROSEMIDE 10 MG/ML
20 INJECTION INTRAMUSCULAR; INTRAVENOUS DAILY
Status: DISCONTINUED | OUTPATIENT
Start: 2024-04-19 | End: 2024-04-19 | Stop reason: HOSPADM

## 2024-04-18 RX ORDER — FUROSEMIDE 10 MG/ML
20 INJECTION INTRAMUSCULAR; INTRAVENOUS ONCE
Status: DISCONTINUED | OUTPATIENT
Start: 2024-04-18 | End: 2024-04-18

## 2024-04-18 RX ORDER — TAMSULOSIN HYDROCHLORIDE 0.4 MG/1
0.4 CAPSULE ORAL EVERY EVENING
Status: DISCONTINUED | OUTPATIENT
Start: 2024-04-18 | End: 2024-04-19 | Stop reason: HOSPADM

## 2024-04-18 RX ORDER — POLYVINYL ALCOHOL 14 MG/ML
1 SOLUTION/ DROPS OPHTHALMIC PRN
Status: DISCONTINUED | OUTPATIENT
Start: 2024-04-18 | End: 2024-04-19 | Stop reason: HOSPADM

## 2024-04-18 RX ORDER — NITROGLYCERIN 0.4 MG/1
0.4 TABLET SUBLINGUAL ONCE
Status: COMPLETED | OUTPATIENT
Start: 2024-04-18 | End: 2024-04-18

## 2024-04-18 RX ORDER — SENNOSIDES A AND B 8.6 MG/1
1 TABLET, FILM COATED ORAL DAILY PRN
Status: DISCONTINUED | OUTPATIENT
Start: 2024-04-18 | End: 2024-04-19 | Stop reason: HOSPADM

## 2024-04-18 RX ORDER — ONDANSETRON 2 MG/ML
4 INJECTION INTRAMUSCULAR; INTRAVENOUS EVERY 6 HOURS PRN
Status: DISCONTINUED | OUTPATIENT
Start: 2024-04-18 | End: 2024-04-19 | Stop reason: HOSPADM

## 2024-04-18 RX ORDER — ARFORMOTEROL TARTRATE 15 UG/2ML
15 SOLUTION RESPIRATORY (INHALATION)
Status: DISCONTINUED | OUTPATIENT
Start: 2024-04-18 | End: 2024-04-19 | Stop reason: HOSPADM

## 2024-04-18 RX ORDER — CYCLOSPORINE 0.5 MG/ML
1 EMULSION OPHTHALMIC 2 TIMES DAILY
Status: DISCONTINUED | OUTPATIENT
Start: 2024-04-18 | End: 2024-04-18 | Stop reason: CLARIF

## 2024-04-18 RX ORDER — POTASSIUM CHLORIDE 7.45 MG/ML
10 INJECTION INTRAVENOUS PRN
Status: DISCONTINUED | OUTPATIENT
Start: 2024-04-18 | End: 2024-04-19 | Stop reason: HOSPADM

## 2024-04-18 RX ORDER — BUDESONIDE 0.5 MG/2ML
0.5 INHALANT ORAL
Status: DISCONTINUED | OUTPATIENT
Start: 2024-04-18 | End: 2024-04-19 | Stop reason: HOSPADM

## 2024-04-18 RX ORDER — SODIUM CHLORIDE 0.9 % (FLUSH) 0.9 %
10 SYRINGE (ML) INJECTION EVERY 12 HOURS SCHEDULED
Status: DISCONTINUED | OUTPATIENT
Start: 2024-04-18 | End: 2024-04-19 | Stop reason: HOSPADM

## 2024-04-18 RX ORDER — BUSPIRONE HYDROCHLORIDE 5 MG/1
5 TABLET ORAL 2 TIMES DAILY
Status: DISCONTINUED | OUTPATIENT
Start: 2024-04-18 | End: 2024-04-18

## 2024-04-18 RX ADMIN — SODIUM CHLORIDE, PRESERVATIVE FREE 10 ML: 5 INJECTION INTRAVENOUS at 23:21

## 2024-04-18 RX ADMIN — Medication 3 MG: at 23:21

## 2024-04-18 RX ADMIN — TAMSULOSIN HYDROCHLORIDE 0.4 MG: 0.4 CAPSULE ORAL at 23:21

## 2024-04-18 RX ADMIN — METOPROLOL SUCCINATE 25 MG: 25 TABLET, EXTENDED RELEASE ORAL at 23:21

## 2024-04-18 RX ADMIN — APIXABAN 2.5 MG: 2.5 TABLET, FILM COATED ORAL at 23:21

## 2024-04-18 RX ADMIN — FUROSEMIDE 20 MG: 10 INJECTION, SOLUTION INTRAMUSCULAR; INTRAVENOUS at 17:41

## 2024-04-18 RX ADMIN — ASPIRIN 324 MG: 81 TABLET, CHEWABLE ORAL at 14:46

## 2024-04-18 RX ADMIN — NITROGLYCERIN 0.4 MG: 0.4 TABLET, ORALLY DISINTEGRATING SUBLINGUAL at 14:48

## 2024-04-18 ASSESSMENT — PAIN - FUNCTIONAL ASSESSMENT: PAIN_FUNCTIONAL_ASSESSMENT: NONE - DENIES PAIN

## 2024-04-18 NOTE — ED PROVIDER NOTES
mmol/L    Potassium 5.4 (H) 3.5 - 5.0 mmol/L    Chloride 101 98 - 107 mmol/L    CO2 21 (L) 22 - 29 mmol/L    Anion Gap 15 7 - 16 mmol/L    Glucose 166 (H) 74 - 99 mg/dL    BUN 79 (H) 6 - 23 mg/dL    Creatinine 1.9 (H) 0.70 - 1.20 mg/dL    Est, Glom Filt Rate 33 (L) >60 mL/min/1.73m2    Calcium 8.8 8.6 - 10.2 mg/dL   Brain Natriuretic Peptide   Result Value Ref Range    Pro-BNP 11,733 (H) 0 - 450 pg/mL   Magnesium   Result Value Ref Range    Magnesium 2.0 1.6 - 2.6 mg/dL   TSH   Result Value Ref Range    TSH 0.09 (L) 0.27 - 4.20 uIU/mL   T4, Free   Result Value Ref Range    T4 Free 2.2 (H) 0.9 - 1.7 ng/dL   Urinalysis with Microscopic   Result Value Ref Range    Color, UA Yellow Yellow    Turbidity UA Clear Clear    Glucose, Ur NEGATIVE NEGATIVE mg/dL    Bilirubin Urine NEGATIVE NEGATIVE    Ketones, Urine NEGATIVE NEGATIVE mg/dL    Specific Gravity, UA 1.020 1.005 - 1.030    Urine Hgb NEGATIVE NEGATIVE    pH, UA 5.5 5.0 - 9.0    Protein, UA NEGATIVE NEGATIVE mg/dL    Urobilinogen, Urine 0.2 0.0 - 1.0 EU/dL    Nitrite, Urine NEGATIVE NEGATIVE    Leukocyte Esterase, Urine NEGATIVE NEGATIVE    WBC, UA 0 TO 5 0 TO 5 /HPF    RBC, UA 0 TO 2 0 TO 2 /HPF    Epithelial Cells UA 3 to 5 /HPF   Basic Metabolic Panel   Result Value Ref Range    Sodium 135 132 - 146 mmol/L    Potassium 4.8 3.5 - 5.0 mmol/L    Chloride 100 98 - 107 mmol/L    CO2 21 (L) 22 - 29 mmol/L    Anion Gap 14 7 - 16 mmol/L    Glucose 162 (H) 74 - 99 mg/dL    BUN 78 (H) 6 - 23 mg/dL    Creatinine 1.9 (H) 0.70 - 1.20 mg/dL    Est, Glom Filt Rate 34 (L) >60 mL/min/1.73m2    Calcium 8.5 (L) 8.6 - 10.2 mg/dL   Troponin   Result Value Ref Range    Troponin, High Sensitivity 98 (H) 0 - 11 ng/L   Troponin   Result Value Ref Range    Troponin, High Sensitivity 95 (H) 0 - 11 ng/L   EKG 12 Lead   Result Value Ref Range    Ventricular Rate 84 BPM    Atrial Rate 96 BPM    QRS Duration 112 ms    Q-T Interval 392 ms    QTc Calculation (Bazett) 463 ms    R Axis -17 degrees

## 2024-04-19 VITALS
SYSTOLIC BLOOD PRESSURE: 109 MMHG | BODY MASS INDEX: 25.2 KG/M2 | HEART RATE: 96 BPM | RESPIRATION RATE: 19 BRPM | TEMPERATURE: 97.9 F | DIASTOLIC BLOOD PRESSURE: 60 MMHG | HEIGHT: 71 IN | WEIGHT: 180 LBS | OXYGEN SATURATION: 96 %

## 2024-04-19 LAB
ALBUMIN SERPL-MCNC: 3.4 G/DL (ref 3.5–5.2)
ALP SERPL-CCNC: 79 U/L (ref 40–129)
ALT SERPL-CCNC: 31 U/L (ref 0–40)
ANION GAP SERPL CALCULATED.3IONS-SCNC: 17 MMOL/L (ref 7–16)
AST SERPL-CCNC: 21 U/L (ref 0–39)
BASOPHILS # BLD: 0 K/UL (ref 0–0.2)
BASOPHILS NFR BLD: 0 % (ref 0–2)
BILIRUB SERPL-MCNC: 0.7 MG/DL (ref 0–1.2)
BUN SERPL-MCNC: 79 MG/DL (ref 6–23)
CALCIUM SERPL-MCNC: 8.8 MG/DL (ref 8.6–10.2)
CHLORIDE SERPL-SCNC: 101 MMOL/L (ref 98–107)
CO2 SERPL-SCNC: 19 MMOL/L (ref 22–29)
CREAT SERPL-MCNC: 1.9 MG/DL (ref 0.7–1.2)
EOSINOPHIL # BLD: 0.17 K/UL (ref 0.05–0.5)
EOSINOPHILS RELATIVE PERCENT: 2 % (ref 0–6)
ERYTHROCYTE [DISTWIDTH] IN BLOOD BY AUTOMATED COUNT: 15.6 % (ref 11.5–15)
GFR SERPL CREATININE-BSD FRML MDRD: 34 ML/MIN/1.73M2
GLUCOSE SERPL-MCNC: 126 MG/DL (ref 74–99)
HCT VFR BLD AUTO: 39.6 % (ref 37–54)
HGB BLD-MCNC: 13 G/DL (ref 12.5–16.5)
LYMPHOCYTES NFR BLD: 0.66 K/UL (ref 1.5–4)
LYMPHOCYTES RELATIVE PERCENT: 7 % (ref 20–42)
MCH RBC QN AUTO: 29.6 PG (ref 26–35)
MCHC RBC AUTO-ENTMCNC: 32.8 G/DL (ref 32–34.5)
MCV RBC AUTO: 90.2 FL (ref 80–99.9)
MONOCYTES NFR BLD: 0.75 K/UL (ref 0.1–0.95)
MONOCYTES NFR BLD: 8 % (ref 2–12)
NEUTROPHILS NFR BLD: 83 % (ref 43–80)
NEUTS SEG NFR BLD: 7.72 K/UL (ref 1.8–7.3)
PLATELET # BLD AUTO: 128 K/UL (ref 130–450)
PMV BLD AUTO: 9.1 FL (ref 7–12)
POTASSIUM SERPL-SCNC: 4.6 MMOL/L (ref 3.5–5)
PROT SERPL-MCNC: 5.6 G/DL (ref 6.4–8.3)
RBC # BLD AUTO: 4.39 M/UL (ref 3.8–5.8)
RBC # BLD: ABNORMAL 10*6/UL
SODIUM SERPL-SCNC: 137 MMOL/L (ref 132–146)
WBC OTHER # BLD: 9.3 K/UL (ref 4.5–11.5)

## 2024-04-19 PROCEDURE — 97530 THERAPEUTIC ACTIVITIES: CPT

## 2024-04-19 PROCEDURE — 36415 COLL VENOUS BLD VENIPUNCTURE: CPT

## 2024-04-19 PROCEDURE — 97161 PT EVAL LOW COMPLEX 20 MIN: CPT

## 2024-04-19 PROCEDURE — 6370000000 HC RX 637 (ALT 250 FOR IP): Performed by: INTERNAL MEDICINE

## 2024-04-19 PROCEDURE — 97165 OT EVAL LOW COMPLEX 30 MIN: CPT

## 2024-04-19 PROCEDURE — 6370000000 HC RX 637 (ALT 250 FOR IP): Performed by: STUDENT IN AN ORGANIZED HEALTH CARE EDUCATION/TRAINING PROGRAM

## 2024-04-19 PROCEDURE — 80053 COMPREHEN METABOLIC PANEL: CPT

## 2024-04-19 PROCEDURE — 2580000003 HC RX 258: Performed by: STUDENT IN AN ORGANIZED HEALTH CARE EDUCATION/TRAINING PROGRAM

## 2024-04-19 PROCEDURE — 94640 AIRWAY INHALATION TREATMENT: CPT

## 2024-04-19 PROCEDURE — 6360000002 HC RX W HCPCS: Performed by: STUDENT IN AN ORGANIZED HEALTH CARE EDUCATION/TRAINING PROGRAM

## 2024-04-19 PROCEDURE — 85025 COMPLETE CBC W/AUTO DIFF WBC: CPT

## 2024-04-19 PROCEDURE — 97535 SELF CARE MNGMENT TRAINING: CPT

## 2024-04-19 RX ORDER — LEVOTHYROXINE SODIUM 88 UG/1
88 TABLET ORAL DAILY
Qty: 30 TABLET | Refills: 3 | Status: SHIPPED | OUTPATIENT
Start: 2024-04-20

## 2024-04-19 RX ORDER — METOPROLOL SUCCINATE 50 MG/1
50 TABLET, EXTENDED RELEASE ORAL DAILY
Qty: 30 TABLET | Refills: 3 | Status: SHIPPED | OUTPATIENT
Start: 2024-04-20

## 2024-04-19 RX ORDER — ISOSORBIDE MONONITRATE 30 MG/1
30 TABLET, EXTENDED RELEASE ORAL DAILY
Qty: 30 TABLET | Refills: 3 | Status: SHIPPED | OUTPATIENT
Start: 2024-04-20

## 2024-04-19 RX ORDER — LEVOTHYROXINE SODIUM 88 UG/1
88 TABLET ORAL DAILY
Status: DISCONTINUED | OUTPATIENT
Start: 2024-04-20 | End: 2024-04-19 | Stop reason: HOSPADM

## 2024-04-19 RX ORDER — METOPROLOL SUCCINATE 25 MG/1
25 TABLET, EXTENDED RELEASE ORAL NIGHTLY
Qty: 30 TABLET | Refills: 3 | Status: SHIPPED | OUTPATIENT
Start: 2024-04-19

## 2024-04-19 RX ORDER — ISOSORBIDE MONONITRATE 30 MG/1
30 TABLET, EXTENDED RELEASE ORAL DAILY
Status: DISCONTINUED | OUTPATIENT
Start: 2024-04-19 | End: 2024-04-19 | Stop reason: HOSPADM

## 2024-04-19 RX ADMIN — ARFORMOTEROL TARTRATE 15 MCG: 15 SOLUTION RESPIRATORY (INHALATION) at 08:24

## 2024-04-19 RX ADMIN — FUROSEMIDE 20 MG: 10 INJECTION, SOLUTION INTRAMUSCULAR; INTRAVENOUS at 06:10

## 2024-04-19 RX ADMIN — METOPROLOL SUCCINATE 50 MG: 50 TABLET, EXTENDED RELEASE ORAL at 09:13

## 2024-04-19 RX ADMIN — ARFORMOTEROL TARTRATE 15 MCG: 15 SOLUTION RESPIRATORY (INHALATION) at 03:33

## 2024-04-19 RX ADMIN — BUDESONIDE INHALATION 500 MCG: 0.5 SUSPENSION RESPIRATORY (INHALATION) at 08:24

## 2024-04-19 RX ADMIN — LEVOTHYROXINE SODIUM 125 MCG: 0.07 TABLET ORAL at 06:10

## 2024-04-19 RX ADMIN — BUDESONIDE INHALATION 500 MCG: 0.5 SUSPENSION RESPIRATORY (INHALATION) at 03:33

## 2024-04-19 RX ADMIN — APIXABAN 2.5 MG: 2.5 TABLET, FILM COATED ORAL at 09:14

## 2024-04-19 RX ADMIN — SODIUM CHLORIDE, PRESERVATIVE FREE 10 ML: 5 INJECTION INTRAVENOUS at 09:14

## 2024-04-19 RX ADMIN — ISOSORBIDE MONONITRATE 30 MG: 30 TABLET, EXTENDED RELEASE ORAL at 13:15

## 2024-04-19 NOTE — CARE COORDINATION
4/19:  Transition of care:  [facility]  Pt is on room air at 95%, Iv Lasix & Po Eliquis - new medication.  TTE needed.   Cardiology consulted.  CM spoke with the pt's daughter Dianne via her phone at [phone] to discuss CM role & dc planning.  Per Dianne pt was active with [facility] via [facility].  She started to say she didn't want any treatments for her father.  When CM asked if she wanted to discuss code status & wishes - daughter requested RN to call her back since she was working.  CM left vmail for Faiza at [facility] to verify information.  Per daughter Pt's PCP is Roseline Guzman & Dr Yun & [facility] [place].  Pt lives with his daughter Dianne in a ranch house with a ramp to enter.  PTA pt is bed bound with family to help with transferring into the bsc & power chair.  Pt has a rollator, power chair, electric wc, shower bench, wc & hospital bed.  Pt is active with with [facility] for 81/2 hrs for 6 days.  Per daughter the dc plan is home & they can transport the pt.  Sw/CM will continue to follow.  Electronically signed by Nancy Beach RN on 4/19/2024 at 11:14 AM  Case Management Assessment  Initial Evaluation    Date/Time of Evaluation: 4/19/2024 11:19 AM  Assessment Completed by: Nancy Beach RN    If patient is discharged prior to next notation, then this note serves as note for discharge by case management.    Patient Name: Grabiel Zavala                   YOB: 1930  Diagnosis: Atrial fibrillation (HCC) [I48.91]  Atrial fibrillation, unspecified type (HCC) [I48.91]  Congestive heart failure, unspecified HF chronicity, unspecified heart failure type (HCC) [I50.9]                   Date / Time: 4/18/2024  2:21 PM    Patient Admission Status: Inpatient   Readmission Risk (Low < 19, Mod (19-27), High > 27): Readmission Risk Score: 14.3    Current PCP: Shin Yun, DO  PCP verified by CM? Yes    Chart Reviewed: Yes       History Provided by: Medical Record, Child/Family  Patient Orientation: Alert and Oriented    Patient Cognition:      Hospitalization in the last 30 days (Readmission):  No    If yes, Readmission Assessment in CM Navigator will be completed.    Advance Directives:      Code Status: DNR-CCA   Patient's Primary Decision Maker is:      Primary Decision Maker: Dianne Zavala - Child - [phone]    Secondary Decision Maker: Grabiel Zavala - Child - [phone]    Discharge Planning:    Patient lives with: Family Members, Other (Comment) (caretaker) Type of Home: House  Primary Care Giver: Private caregiver  Patient Support Systems include: Family Members, Children   Current Financial resources:    Current community resources:    Current services prior to admission: Home Care, Hospice Services            Current DME:              Type of Home Care services:  Aide Services, Nursing Services    ADLS  Prior functional level: Assistance with the following:, Bathing, Dressing, Toileting, Cooking, Housework, Shopping, Mobility  Current functional level: Assistance with the following:, Bathing, Dressing, Toileting, Cooking, Housework, Shopping, Mobility    PT AM-PAC:   /24  OT AM-PAC:   /24    Family can provide assistance at DC: Yes  Would you like Case Management to discuss the discharge plan with any other family members/significant others, and if so, who? Yes (Cm spoke with pt daughter to discuss dc planning)  Plans to Return to Present Housing: Yes  Other Identified Issues/Barriers to RETURNING to current housing:   Potential Assistance needed at discharge: Home Care            Potential DME:    Patient expects to discharge to: House  Plan for transportation at discharge:      Financial    Payor: [employer] / Plan: [employer] / Product Type: *No Product type* /     Does insurance require precert for SNF: Yes    Potential assistance Purchasing Medications:    Meds-to-Beds request:        [facility]

## 2024-04-19 NOTE — CARE COORDINATION
4/19:  Update CM Note:  [facility]  Electronically signed by Nancy Beach RN on 4/19/2024 at 11:20 AM

## 2024-04-19 NOTE — PROGRESS NOTES
4 Eyes Skin Assessment     NAME:  Grabiel Zavala  YOB: 1930  MEDICAL RECORD NUMBER:  [id number]    The patient is being assessed for  Admission    I agree that at least one RN has performed a thorough Head to Toe Skin Assessment on the patient. ALL assessment sites listed below have been assessed.      Areas assessed by both nurses:    Head, Face, Ears, Shoulders, Back, Chest, Arms, Elbows, Hands, Sacrum. Buttock, Coccyx, Ischium, Legs. Feet and Heels, and Under Medical Devices         Does the Patient have a Wound? Yes wound(s) were present on assessment. LDA wound assessment was Initiated and completed by RN       Gustavo Prevention initiated by RN: Yes  Wound Care Orders initiated by RN: Yes    Pressure Injury (Stage 3,4, Unstageable, DTI, NWPT, and Complex wounds) if present, place Wound referral order by RN under : No    New Ostomies, if present place, Ostomy referral order under : No     Nurse 1 eSignature: Electronically signed by Lucy Pugh RN on 4/19/24 at 3:33 AM EDT    **SHARE this note so that the co-signing nurse can place an eSignature**    Nurse 2 eSignature: {Esignature:[id number]}

## 2024-04-19 NOTE — PROGRESS NOTES
CLINICAL PHARMACY NOTE: MEDS TO BEDS    Total # of Prescriptions Filled: 1   The following medications were delivered to the patient:  Isosorbide mono er 30    Additional Documentation:  Pt picked in pharmacy

## 2024-04-19 NOTE — PROGRESS NOTES
Consult received for AFIB RVR. Patient follows with Dr. Moore. Consult deferred to [facility] at this time.

## 2024-04-19 NOTE — PROGRESS NOTES
Occupational Therapy  OCCUPATIONAL THERAPY INITIAL EVALUATION    [facility]  [address], [place]      Date:2024                                                Patient Name: Grabiel Zavala  MRN: [id number]  : 1930  Room: [facility]6420    Evaluating OT: Amauri Valentine OTR/L #[id number]     Referring Provider:       Amauri Evans MD     Specific Provider Orders/Date: OT eval and treat 24    Diagnosis: Atrial fibrillation (HCC) [I48.91]  Atrial fibrillation, unspecified type (HCC) [I48.91]  Congestive heart failure, unspecified HF chronicity, unspecified heart failure type (HCC) [I50.9]   Pt admitted to hospital with AF     Pertinent Medical History:  has a past medical history of Asthma, CAD (coronary artery disease), COPD (chronic obstructive pulmonary disease) (HCC), HIGH CHOLESTEROL, History of DVT (deep vein thrombosis), Hx of blood clots, Hypertension, Thyroid disease, and TIA (transient ischemic attack).       Precautions:  Fall Risk, TAPS, bed alarm    Assessment of current deficits    [x] Functional mobility  [x]ADLs  [x] Strength               []Cognition    [x] Functional transfers   [x] IADLs         [x] Safety Awareness   [x]Endurance    [] Fine Coordination              [x] Balance      [] Vision/perception   []Sensation     []Gross Motor Coordination  [] ROM  [] Delirium                   [] Motor Control     OT PLAN OF CARE   OT POC based on physician orders, patient diagnosis and results of clinical assessment    Frequency/Duration 1-3 days/wk for 2 weeks PRN   Specific OT Treatment Interventions to include:   * Instruction/training on adapted ADL techniques and AE recommendations to increase functional independence within precautions       * Training on energy conservation strategies, correct breathing pattern and techniques to improve independence/tolerance for self-care routine  * Functional transfer/mobility

## 2024-04-19 NOTE — PROGRESS NOTES
Physical Therapy Initial Evaluation    Name: Grabiel Zavala  : 1930  MRN: [id number]      Date of Service: 2024    Evaluating PT:  Fredi Peterson, PT [id number]    Referring provider/PT Order:  PT Eval and Treat   24  PT evaluation and treat  Start:  24,   End:  24,   ONE TIME,   Standing Count:  1 Occurrences,   R         Amauri Evans MD     Room #:  6420/6420-B  Diagnosis:  Atrial fibrillation (HCC) [I48.91]  Atrial fibrillation, unspecified type (HCC) [I48.91]  Congestive heart failure, unspecified HF chronicity, unspecified heart failure type (HCC) [I50.9]  PMHx/PSHx:     has a past medical history of Asthma, CAD (coronary artery disease), COPD (chronic obstructive pulmonary disease) (HCC), HIGH CHOLESTEROL, History of DVT (deep vein thrombosis), Hx of blood clots, Hypertension, Thyroid disease, and TIA (transient ischemic attack).    has a past surgical history that includes Tonsillectomy; Appendectomy; Gallbladder surgery; hip surgery; back surgery; Hand surgery; Cholecystectomy; joint replacement; Diagnostic Cardiac Cath Lab Procedure (2004); Coronary artery bypass graft (3/2004); Carotid endarterectomy (3110-9024); cardiovascular stress test; transthoracic echocardiogram (); Cataract removal; Cardiac catheterization (2017); eye surgery; cyst removal; other surgical history (2017); and hernia repair (Right, 2022).     Procedure/Surgery:  none  Precautions:  Falls, FWB (full weight bearing), poor functional mobility.  Equipment Needs: Patient has needed equipment ,    SUBJECTIVE:    Patient lives with daughter  in a ranch home  with Ramp to enter.  Bed is on 1 floor and bath is on 1 floor.  Patient uses power w/c in his home. Assistance required for transfers.  Equipment owned: Electric wheelchair, Hospital bed, and Sliding board,      OBJECTIVE:   Initial Evaluation  Date: 24 Treatment Short Term/ Long Term   Goals   AM-PAC 6 Clicks

## 2024-04-19 NOTE — CONSULTS
[facility] cardiology/[facility]    INPATIENT CARDIOLOGY CONSULT    Name: Grabiel Zavala    Age: 89 y.o.    Date of Admission: 4/18/2024  2:21 PM    Date of Service: 4/19/2024    Reason for Consultation: Chest symptoms he had at rest while being driven somewhere    Referring Physician:     History of Present Illness: The patient is a 89 y.o. 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  By his report he does not walk due to chronic progressive lower extremity weakness.  He denies any orthopnea or heart failure symptoms.  Today he is laying pretty flat in bed in no distress denies any chest pain currently or overnight.  He has been in hospice care and is DNR [facility].    Past Medical History:   Echocardiogram August 2020 LVEF 30% 3+ MR 1+ AI moderate pulmonary hypertension  Atrial fibrillation not on anticoagulation due to prior bleeding issues.    Past surgical history: As above and prior bilateral carotid endarterectomy.    Review of Systems:     Constitutional: No fever, chills, sweats  Cardiac: As per HPI  Pulmonary: No cough, wheeze, hemoptysis  HEENT: No visual disturbances or difficult swallowing  GI: No nausea, vomiting, diarrhea, abdominal pain, rectal bleeding  : No dysuria or hematuria  Endocrine: No excessive thirst, heat or cold intolerance.   Musculoskeletal: No joint pain or muscle aches. No claudication  Skin: No skin breakdown or rashes  Neuro: No headache, confusion, or seizures  Psych: No depression, anxiety      Family History:  Family History   Problem Relation Age of Onset    Heart Disease Mother     Heart Disease Father        Social History:  Social History     Socioeconomic History     Marital status:      Spouse name: Not on file    Number of children: Not on file    Years of education: Not on file    Highest education level: Not on file   Occupational History    Not on file   Tobacco Use    Smoking status: Former     Current packs/day: 0.00     Average packs/day: 0.3 packs/day for 30.0 years (7.5 ttl pk-yrs)     Types: Cigarettes     Start date: 1974     Quit date: 2004     Years since quittin.3    Smokeless tobacco: Never   Vaping Use    Vaping Use: Never used   Substance and Sexual Activity    Alcohol use: Yes     Alcohol/week: 4.0 standard drinks of alcohol     Types: 2 Glasses of wine, 2 Cans of beer per week    Drug use: No    Sexual activity: Never   Other Topics Concern    Not on file   Social History Narrative    Not on file     Social Determinants of Health     Financial Resource Strain: Not on file   Food Insecurity: No Food Insecurity (2024)    Hunger Vital Sign     Worried About Running Out of Food in the Last Year: Never true     Ran Out of Food in the Last Year: Never true   Transportation Needs: No Transportation Needs (2024)    PRAPARE - Transportation     Lack of Transportation (Medical): No     Lack of Transportation (Non-Medical): No   Physical Activity: Not on file   Stress: Not on file   Social Connections: Not on file   Intimate Partner Violence: Not on file   Housing Stability: Low Risk  (2024)    Housing Stability Vital Sign     Unable to Pay for Housing in the Last Year: No     Number of Places Lived in the Last Year: 1     Unstable Housing in the Last Year: No       Allergies:  No Known Allergies    Current Medications:  Current Facility-Administered Medications   Medication Dose Route Frequency Provider Last Rate Last Admin    [START ON 2024] levothyroxine (SYNTHROID) tablet 88 mcg  88 mcg Oral Daily Amauri Evans MD        sodium chloride flush 0.9 % injection 10 mL  10 mL IntraVENous 2 times per day Amauri Evans MD   10 mL

## 2024-04-19 NOTE — PROGRESS NOTES
spoke with daughter Breana, who is POA regarding clarification of code status. Patient is a dnr-cc. Daughter declined echo and cardiology to see him, also any procedures while in hospital. Only wish is to keep patient comfortable. Attending aware.

## 2024-04-20 NOTE — DISCHARGE SUMMARY
The patient was discharged on the same day as history and physical.  Please see history and physical as well as imaging studies, consult and evaluations, and nurse's notes.    Electronically signed by Amauri Evans MD on 4/20/2024 at 2:46 PM

## 2024-04-23 NOTE — CARE COORDINATION
4/23:  Update CM Note:  BELLA faxed [facility] order to [phone].  Electronically signed by Nancy Beach RN on 4/23/2024 at 10:29 AM

## (undated) DEVICE — DRAPE,REIN 53X77,STERILE: Brand: MEDLINE

## (undated) DEVICE — SOLUTION IV IRRIG POUR BRL 0.9% SODIUM CHL 2F7124

## (undated) DEVICE — TOTAL TRAY, DB, 100% SILI FOLEY, 16FR 10: Brand: MEDLINE

## (undated) DEVICE — STAPLER INT DIA5MM 25 ABSRB STRP FIX DISP FOR HERN MESH

## (undated) DEVICE — STANDARD HYPODERMIC NEEDLE,ALUMINUM HUB: Brand: MONOJECT

## (undated) DEVICE — GOWN,SIRUS,FABRNF,XL,20/CS: Brand: MEDLINE

## (undated) DEVICE — GENERAL LAPAROSCOPY: Brand: MEDLINE INDUSTRIES, INC.

## (undated) DEVICE — SCISSORS ENDOSCP DIA5MM CRV MPLR CAUT W/ RATCH HNDL

## (undated) DEVICE — WARMER SCP LAP

## (undated) DEVICE — GLOVE ORANGE PI 7 1/2   MSG9075

## (undated) DEVICE — TROCAR: Brand: KII® SLEEVE

## (undated) DEVICE — INSUFFLATION NEEDLE TO ESTABLISH PNEUMOPERITONEUM.: Brand: INSUFFLATION NEEDLE

## (undated) DEVICE — SYRINGE 20ML LL S/C 50

## (undated) DEVICE — TROCAR: Brand: KII FIOS FIRST ENTRY

## (undated) DEVICE — ELECTRODE PT RET AD L9FT HI MOIST COND ADH HYDRGEL CORDED

## (undated) DEVICE — ADHESIVE SKIN CLSR 0.7ML TOP DERMBND ADV

## (undated) DEVICE — SOLUTION IRRIG 1000ML 0.9% SOD CHL USP POUR PLAS BTL